# Patient Record
Sex: FEMALE | Race: BLACK OR AFRICAN AMERICAN | Employment: FULL TIME | URBAN - METROPOLITAN AREA
[De-identification: names, ages, dates, MRNs, and addresses within clinical notes are randomized per-mention and may not be internally consistent; named-entity substitution may affect disease eponyms.]

---

## 2020-01-29 ENCOUNTER — HOSPITAL ENCOUNTER (OUTPATIENT)
Dept: ULTRASOUND IMAGING | Age: 25
Discharge: HOME | End: 2020-01-29
Payer: COMMERCIAL

## 2020-01-29 ENCOUNTER — HOSPITAL ENCOUNTER (OUTPATIENT)
Age: 25
Discharge: HOME | End: 2020-01-29
Payer: COMMERCIAL

## 2020-01-29 LAB
ALBUMIN SERPL-MCNC: 4.5 G/DL (ref 3.5–5.2)
ALP BLD-CCNC: 67 U/L (ref 35–104)
ALT SERPL-CCNC: 10 U/L (ref 0–32)
AMYLASE: 117 U/L (ref 20–100)
ANION GAP SERPL CALCULATED.3IONS-SCNC: 13 MMOL/L (ref 7–16)
AST SERPL-CCNC: 19 U/L (ref 0–31)
BASOPHILS ABSOLUTE: 0.03 E9/L (ref 0–0.2)
BASOPHILS RELATIVE PERCENT: 0.5 % (ref 0–2)
BILIRUB SERPL-MCNC: 0.4 MG/DL (ref 0–1.2)
BUN BLDV-MCNC: 7 MG/DL (ref 6–20)
CALCIUM SERPL-MCNC: 9.8 MG/DL (ref 8.6–10.2)
CHLORIDE BLD-SCNC: 104 MMOL/L (ref 98–107)
CO2: 25 MMOL/L (ref 22–29)
CREAT SERPL-MCNC: 0.8 MG/DL (ref 0.5–1)
EOSINOPHILS ABSOLUTE: 0.5 E9/L (ref 0.05–0.5)
EOSINOPHILS RELATIVE PERCENT: 8.3 % (ref 0–6)
GFR AFRICAN AMERICAN: >60
GFR NON-AFRICAN AMERICAN: >60 ML/MIN/1.73
GLUCOSE FASTING: 96 MG/DL (ref 74–99)
HCT VFR BLD CALC: 39.3 % (ref 34–48)
HEMOGLOBIN: 12.6 G/DL (ref 11.5–15.5)
IMMATURE GRANULOCYTES #: 0.01 E9/L
IMMATURE GRANULOCYTES %: 0.2 % (ref 0–5)
LIPASE: 16 U/L (ref 13–60)
LYMPHOCYTES ABSOLUTE: 2.32 E9/L (ref 1.5–4)
LYMPHOCYTES RELATIVE PERCENT: 38.7 % (ref 20–42)
MCH RBC QN AUTO: 31.3 PG (ref 26–35)
MCHC RBC AUTO-ENTMCNC: 32.1 % (ref 32–34.5)
MCV RBC AUTO: 97.5 FL (ref 80–99.9)
MONOCYTES ABSOLUTE: 0.52 E9/L (ref 0.1–0.95)
MONOCYTES RELATIVE PERCENT: 8.7 % (ref 2–12)
NEUTROPHILS ABSOLUTE: 2.62 E9/L (ref 1.8–7.3)
NEUTROPHILS RELATIVE PERCENT: 43.6 % (ref 43–80)
PDW BLD-RTO: 12 FL (ref 11.5–15)
PLATELET # BLD: 280 E9/L (ref 130–450)
PMV BLD AUTO: 10.4 FL (ref 7–12)
POTASSIUM SERPL-SCNC: 3.9 MMOL/L (ref 3.5–5)
RBC # BLD: 4.03 E12/L (ref 3.5–5.5)
SEDIMENTATION RATE, ERYTHROCYTE: 6 MM/HR (ref 0–20)
SODIUM BLD-SCNC: 142 MMOL/L (ref 132–146)
TOTAL PROTEIN: 7.5 G/DL (ref 6.4–8.3)
TSH SERPL DL<=0.05 MIU/L-ACNC: 0.88 UIU/ML (ref 0.27–4.2)
WBC # BLD: 6 E9/L (ref 4.5–11.5)

## 2020-01-29 PROCEDURE — 80053 COMPREHEN METABOLIC PANEL: CPT

## 2020-01-29 PROCEDURE — 84443 ASSAY THYROID STIM HORMONE: CPT

## 2020-01-29 PROCEDURE — 83690 ASSAY OF LIPASE: CPT

## 2020-01-29 PROCEDURE — 76705 ECHO EXAM OF ABDOMEN: CPT

## 2020-01-29 PROCEDURE — 85651 RBC SED RATE NONAUTOMATED: CPT

## 2020-01-29 PROCEDURE — 36415 COLL VENOUS BLD VENIPUNCTURE: CPT

## 2020-01-29 PROCEDURE — 85025 COMPLETE CBC W/AUTO DIFF WBC: CPT

## 2020-01-29 PROCEDURE — 82150 ASSAY OF AMYLASE: CPT

## 2020-03-17 ENCOUNTER — HOSPITAL ENCOUNTER (EMERGENCY)
Age: 25
Discharge: HOME | End: 2020-03-17
Attending: EMERGENCY MEDICINE
Payer: COMMERCIAL

## 2020-03-17 VITALS
OXYGEN SATURATION: 98 % | SYSTOLIC BLOOD PRESSURE: 122 MMHG | HEIGHT: 65 IN | DIASTOLIC BLOOD PRESSURE: 75 MMHG | TEMPERATURE: 97.3 F | HEART RATE: 80 BPM | WEIGHT: 139 LBS | RESPIRATION RATE: 16 BRPM | BODY MASS INDEX: 23.16 KG/M2

## 2020-03-17 LAB — STREP GRP A PCR: NEGATIVE

## 2020-03-17 PROCEDURE — 87880 STREP A ASSAY W/OPTIC: CPT

## 2020-03-17 PROCEDURE — G0381 LEV 2 HOSP TYPE B ED VISIT: HCPCS

## 2020-03-17 NOTE — ED PROVIDER NOTES
todays results, in addition to providing specific details for the plan of care and counseling regarding the diagnosis and prognosis. Questions are answered at this time and they are agreeable with the plan.      --------------------------------- IMPRESSION AND DISPOSITION ---------------------------------    IMPRESSION  1.  Acute pharyngitis, unspecified etiology        DISPOSITION  Disposition: Discharge to home  Patient condition is stable       Jorge Rankin DO  03/17/20 1529

## 2020-07-27 ENCOUNTER — HOSPITAL ENCOUNTER (OUTPATIENT)
Age: 25
Discharge: HOME | End: 2020-07-29
Payer: COMMERCIAL

## 2020-07-27 ENCOUNTER — OFFICE VISIT (OUTPATIENT)
Dept: FAMILY MEDICINE CLINIC | Age: 25
End: 2020-07-27
Payer: COMMERCIAL

## 2020-07-27 VITALS
TEMPERATURE: 98.1 F | RESPIRATION RATE: 16 BRPM | DIASTOLIC BLOOD PRESSURE: 72 MMHG | BODY MASS INDEX: 21.49 KG/M2 | SYSTOLIC BLOOD PRESSURE: 98 MMHG | OXYGEN SATURATION: 100 % | HEART RATE: 64 BPM | WEIGHT: 129 LBS | HEIGHT: 65 IN

## 2020-07-27 LAB
ALBUMIN SERPL-MCNC: 4.1 G/DL (ref 3.5–5.2)
ALP BLD-CCNC: 56 U/L (ref 35–104)
ALT SERPL-CCNC: 9 U/L (ref 0–32)
ANION GAP SERPL CALCULATED.3IONS-SCNC: 13 MMOL/L (ref 7–16)
AST SERPL-CCNC: 20 U/L (ref 0–31)
BASOPHILS ABSOLUTE: 0.02 E9/L (ref 0–0.2)
BASOPHILS RELATIVE PERCENT: 0.4 % (ref 0–2)
BILIRUB SERPL-MCNC: 0.2 MG/DL (ref 0–1.2)
BUN BLDV-MCNC: 7 MG/DL (ref 6–20)
CALCIUM SERPL-MCNC: 9.5 MG/DL (ref 8.6–10.2)
CHLORIDE BLD-SCNC: 105 MMOL/L (ref 98–107)
CHOLESTEROL, TOTAL: 132 MG/DL (ref 0–199)
CO2: 24 MMOL/L (ref 22–29)
CREAT SERPL-MCNC: 0.8 MG/DL (ref 0.5–1)
EOSINOPHILS ABSOLUTE: 0.19 E9/L (ref 0.05–0.5)
EOSINOPHILS RELATIVE PERCENT: 3.3 % (ref 0–6)
GFR AFRICAN AMERICAN: >60
GFR NON-AFRICAN AMERICAN: >60 ML/MIN/1.73
GLUCOSE BLD-MCNC: 90 MG/DL (ref 74–99)
HBA1C MFR BLD: 5.7 % (ref 4–5.6)
HCG QUALITATIVE: NEGATIVE
HCT VFR BLD CALC: 39.6 % (ref 34–48)
HDLC SERPL-MCNC: 50 MG/DL
HEMOGLOBIN: 12.1 G/DL (ref 11.5–15.5)
IMMATURE GRANULOCYTES #: 0.02 E9/L
IMMATURE GRANULOCYTES %: 0.4 % (ref 0–5)
LDL CHOLESTEROL CALCULATED: 72 MG/DL (ref 0–99)
LYMPHOCYTES ABSOLUTE: 2.09 E9/L (ref 1.5–4)
LYMPHOCYTES RELATIVE PERCENT: 36.6 % (ref 20–42)
MCH RBC QN AUTO: 31.3 PG (ref 26–35)
MCHC RBC AUTO-ENTMCNC: 30.6 % (ref 32–34.5)
MCV RBC AUTO: 102.6 FL (ref 80–99.9)
MONOCYTES ABSOLUTE: 0.49 E9/L (ref 0.1–0.95)
MONOCYTES RELATIVE PERCENT: 8.6 % (ref 2–12)
NEUTROPHILS ABSOLUTE: 2.9 E9/L (ref 1.8–7.3)
NEUTROPHILS RELATIVE PERCENT: 50.7 % (ref 43–80)
PDW BLD-RTO: 12.4 FL (ref 11.5–15)
PLATELET # BLD: 248 E9/L (ref 130–450)
PMV BLD AUTO: 11.4 FL (ref 7–12)
POTASSIUM SERPL-SCNC: 4.7 MMOL/L (ref 3.5–5)
RBC # BLD: 3.86 E12/L (ref 3.5–5.5)
SODIUM BLD-SCNC: 142 MMOL/L (ref 132–146)
TOTAL PROTEIN: 7.3 G/DL (ref 6.4–8.3)
TRIGL SERPL-MCNC: 49 MG/DL (ref 0–149)
TSH SERPL DL<=0.05 MIU/L-ACNC: 1.15 UIU/ML (ref 0.27–4.2)
VLDLC SERPL CALC-MCNC: 10 MG/DL
WBC # BLD: 5.7 E9/L (ref 4.5–11.5)

## 2020-07-27 PROCEDURE — 80061 LIPID PANEL: CPT

## 2020-07-27 PROCEDURE — 80053 COMPREHEN METABOLIC PANEL: CPT

## 2020-07-27 PROCEDURE — 84443 ASSAY THYROID STIM HORMONE: CPT

## 2020-07-27 PROCEDURE — 36415 COLL VENOUS BLD VENIPUNCTURE: CPT

## 2020-07-27 PROCEDURE — 84703 CHORIONIC GONADOTROPIN ASSAY: CPT

## 2020-07-27 PROCEDURE — 83036 HEMOGLOBIN GLYCOSYLATED A1C: CPT

## 2020-07-27 PROCEDURE — 99204 OFFICE O/P NEW MOD 45 MIN: CPT | Performed by: FAMILY MEDICINE

## 2020-07-27 PROCEDURE — 85025 COMPLETE CBC W/AUTO DIFF WBC: CPT

## 2020-07-27 ASSESSMENT — PATIENT HEALTH QUESTIONNAIRE - PHQ9
SUM OF ALL RESPONSES TO PHQ9 QUESTIONS 1 & 2: 0
2. FEELING DOWN, DEPRESSED OR HOPELESS: 0
1. LITTLE INTEREST OR PLEASURE IN DOING THINGS: 0
SUM OF ALL RESPONSES TO PHQ QUESTIONS 1-9: 0
SUM OF ALL RESPONSES TO PHQ QUESTIONS 1-9: 0

## 2020-08-01 PROBLEM — M25.552 PAIN OF LEFT HIP JOINT: Status: ACTIVE | Noted: 2020-08-01

## 2020-08-01 PROBLEM — R63.4 WEIGHT LOSS: Status: ACTIVE | Noted: 2020-08-01

## 2020-08-01 PROBLEM — M25.511 CHRONIC RIGHT SHOULDER PAIN: Status: ACTIVE | Noted: 2020-08-01

## 2020-08-01 PROBLEM — Z00.00 PHYSICAL EXAM: Status: ACTIVE | Noted: 2020-08-01

## 2020-08-01 PROBLEM — G89.29 CHRONIC RIGHT SHOULDER PAIN: Status: ACTIVE | Noted: 2020-08-01

## 2020-08-01 PROBLEM — E78.2 MIXED HYPERLIPIDEMIA: Status: ACTIVE | Noted: 2020-08-01

## 2020-08-01 PROBLEM — R53.83 FATIGUE: Status: ACTIVE | Noted: 2020-08-01

## 2020-08-01 ASSESSMENT — ENCOUNTER SYMPTOMS
COLOR CHANGE: 0
EYE REDNESS: 0
EYE DISCHARGE: 0
NAUSEA: 0
ANAL BLEEDING: 0
DIARRHEA: 0
EYE ITCHING: 0
RESPIRATORY NEGATIVE: 1
COUGH: 0
SHORTNESS OF BREATH: 0
ABDOMINAL PAIN: 0
SINUS PRESSURE: 0
EYE PAIN: 0
RECTAL PAIN: 0
CHEST TIGHTNESS: 0
CHOKING: 0
RHINORRHEA: 0
VOICE CHANGE: 0
SORE THROAT: 0
ALLERGIC/IMMUNOLOGIC NEGATIVE: 1
BACK PAIN: 0
PHOTOPHOBIA: 0
FACIAL SWELLING: 0
BLOOD IN STOOL: 0
WHEEZING: 0
SINUS PAIN: 0
STRIDOR: 0
ABDOMINAL DISTENTION: 0
EYES NEGATIVE: 1
TROUBLE SWALLOWING: 0
CONSTIPATION: 0
VOMITING: 0

## 2020-08-02 NOTE — PROGRESS NOTES
SUBJECTIVE  Fco Ramsey is a 25 y.o. female. HPI/Chief C/O:  Chief Complaint   Patient presents with   [facility] Establish Care    Weight Loss     ongoing since February- she lost 60 lbs without changing anything     No Known Allergies  This 25year old female presents for physical exam. Pt c/o weight loss, 60 pounds since 2/2020 without trying with decreased appetite. Pt has left hip and right shoulder pain. ROS:  Review of Systems   Constitutional: Positive for appetite change, fatigue and unexpected weight change. Negative for activity change, chills, diaphoresis and fever. HENT: Negative. Negative for congestion, dental problem, drooling, ear discharge, ear pain, facial swelling, hearing loss, mouth sores, nosebleeds, postnasal drip, rhinorrhea, sinus pressure, sinus pain, sneezing, sore throat, tinnitus, trouble swallowing and voice change. Eyes: Negative. Negative for photophobia, pain, discharge, redness, itching and visual disturbance. Respiratory: Negative. Negative for cough, choking, chest tightness, shortness of breath, wheezing and stridor. Cardiovascular: Negative. Negative for chest pain, palpitations and leg swelling. Gastrointestinal: Negative for abdominal distention, abdominal pain, anal bleeding, blood in stool, constipation, diarrhea, nausea, rectal pain and vomiting. Endocrine: Negative. Negative for cold intolerance, heat intolerance, polydipsia, polyphagia and polyuria. Genitourinary: Negative. Negative for decreased urine volume, difficulty urinating, dysuria, flank pain, frequency, genital sores, hematuria, menstrual problem, pelvic pain and urgency. Musculoskeletal: Positive for arthralgias and myalgias. Negative for back pain, gait problem, joint swelling, neck pain and neck stiffness. Skin: Negative. Negative for color change, pallor, rash and wound. Allergic/Immunologic: Negative. Neurological: Negative.   Negative for dizziness, tremors, seizures, syncope, facial asymmetry, speech difficulty, weakness, light-headedness, numbness and headaches. Hematological: Negative. Negative for adenopathy. Does not bruise/bleed easily. Psychiatric/Behavioral: Positive for sleep disturbance. Negative for agitation, behavioral problems, confusion, decreased concentration, dysphoric mood, hallucinations, self-injury and suicidal ideas. The patient is nervous/anxious. The patient is not hyperactive. Past Medical/Surgical Hx;  Reviewed with patient      Diagnosis Date    Mixed hyperlipidemia 8/1/2020     Past Surgical History:   Procedure Laterality Date    UMBILICAL HERNIA REPAIR  10/1995       Past Family Hx:  Reviewed with patient  History reviewed. No pertinent family history. Social Hx:  Reviewed with patient  Social History     Tobacco Use    Smoking status: Current Every Day Smoker     Packs/day: 0.50     Types: Cigarettes    Smokeless tobacco: Never Used   Substance Use Topics    Alcohol use: No     Alcohol/week: 0.0 standard drinks       OBJECTIVE  BP 98/72   Pulse 64   Temp 98.1 °F (36.7 °C) (Tympanic)   Resp 16   Ht 5' 5\" (1.651 m)   Wt 129 lb (58.5 kg)   LMP 07/25/2020   SpO2 100%   BMI 21.47 kg/m²     Problem List:  Angie Polanco does not have any pertinent problems on file. PHYS EX:  Physical Exam  Vitals signs and nursing note reviewed. Constitutional:       General: She is not in acute distress. Appearance: Normal appearance. She is well-developed. She is not ill-appearing, toxic-appearing or diaphoretic. HENT:      Head: Normocephalic and atraumatic. Right Ear: Tympanic membrane, ear canal and external ear normal.      Left Ear: Tympanic membrane, ear canal and external ear normal.      Nose: Nose normal. No congestion or rhinorrhea. Mouth/Throat:      Mouth: Mucous membranes are moist.      Pharynx: Oropharynx is clear. No oropharyngeal exudate or posterior oropharyngeal erythema.    Eyes:      General: No scleral icterus. Right eye: No discharge. Left eye: No discharge. Conjunctiva/sclera: Conjunctivae normal.      Pupils: Pupils are equal, round, and reactive to light. Neck:      Musculoskeletal: Normal range of motion and neck supple. No neck rigidity or muscular tenderness. Thyroid: No thyromegaly. Vascular: No carotid bruit or JVD. Trachea: No tracheal deviation. Cardiovascular:      Rate and Rhythm: Normal rate and regular rhythm. Pulses: Normal pulses. Heart sounds: Normal heart sounds. No murmur. No friction rub. No gallop. Pulmonary:      Effort: Pulmonary effort is normal. No respiratory distress. Breath sounds: Normal breath sounds. No stridor. No wheezing, rhonchi or rales. Chest:      Chest wall: No tenderness. Abdominal:      General: Bowel sounds are normal. There is no distension. Palpations: Abdomen is soft. There is no mass. Tenderness: There is no abdominal tenderness. There is no guarding or rebound. Hernia: No hernia is present. Musculoskeletal:         General: Tenderness present. No swelling, deformity or signs of injury. Right lower leg: No edema. Left lower leg: No edema. Comments: Pain and decreased ROM left hip and right shoulder. Lymphadenopathy:      Cervical: No cervical adenopathy. Skin:     General: Skin is warm. Coloration: Skin is not jaundiced or pale. Findings: No bruising, erythema, lesion or rash. Neurological:      General: No focal deficit present. Mental Status: She is alert and oriented to person, place, and time. Cranial Nerves: No cranial nerve deficit. Sensory: No sensory deficit. Motor: No weakness or abnormal muscle tone. Coordination: Coordination normal.      Gait: Gait normal.      Deep Tendon Reflexes: Reflexes are normal and symmetric.  Reflexes normal.   Psychiatric:         Mood and Affect: Mood normal.         Behavior: Behavior normal. Thought Content: Thought content normal.         Judgment: Judgment normal.         ASSESSMENT/PLAN  Betsy was seen today for establish care and weight loss. Diagnoses and all orders for this visit:    Physical exam  -     COMPREHENSIVE METABOLIC PANEL; Future  -     CBC Auto Differential; Future  Not controlled. Lab. Weight loss  -     COMPREHENSIVE METABOLIC PANEL; Future  -     CBC Auto Differential; Future  Not controlled. Lab. Mixed hyperlipidemia  -     COMPREHENSIVE METABOLIC PANEL; Future  -     LIPID PANEL; Future  -     CBC Auto Differential; Future  Stable. Lab, low chol. Diet. IFG (impaired fasting glucose)  -     COMPREHENSIVE METABOLIC PANEL; Future  -     Hemoglobin A1C; Future  -     CBC Auto Differential; Future  Patient instructed on labs. Fatigue, unspecified type  -     COMPREHENSIVE METABOLIC PANEL; Future  -     TSH without Reflex; Future  -     CBC Auto Differential; Future  -     HCG, SERUM, QUALITATIVE; Future  Not controlled lab. Pain of left hip joint  -     COMPREHENSIVE METABOLIC PANEL; Future  -     CBC Auto Differential; Future  -     XR HIP LEFT (2-3 VIEWS); Future  Not controlled. Chronic right shoulder pain  -     COMPREHENSIVE METABOLIC PANEL; Future  -     CBC Auto Differential; Future  -     XR SHOULDER RIGHT (MIN 2 VIEWS); Future  Not controlled. Pt instructed if any worse go ED ASAP. No outpatient encounter medications on file as of 7/27/2020. No facility-administered encounter medications on file as of 7/27/2020. Return in about 4 weeks (around 8/24/2020).         Reviewed recent labs related to Betsy's current problems      Discussed importance of regular Health Maintenance follow up  Health Maintenance   Topic    Varicella vaccine (1 of 2 - 2-dose childhood series)    Pneumococcal 0-64 years Vaccine (1 of 1 - PPSV23)    HPV vaccine (1 - 2-dose series)    HIV screen     Chlamydia screen     DTaP/Tdap/Td vaccine (1 - Tdap)    Cervical cancer screen     Flu vaccine (1)    A1C test (Diabetic or Prediabetic)     Hepatitis A vaccine     Hepatitis B vaccine     Hib vaccine     Meningococcal (ACWY) vaccine

## 2020-08-31 PROBLEM — Z00.00 PHYSICAL EXAM: Status: RESOLVED | Noted: 2020-08-01 | Resolved: 2020-08-31

## 2023-03-20 LAB
ERYTHROCYTE DISTRIBUTION WIDTH (RATIO) BY AUTOMATED COUNT: 11.6 % (ref 11.5–14.5)
ERYTHROCYTE MEAN CORPUSCULAR HEMOGLOBIN CONCENTRATION (G/DL) BY AUTOMATED: 32.7 G/DL (ref 32–36)
ERYTHROCYTE MEAN CORPUSCULAR VOLUME (FL) BY AUTOMATED COUNT: 96 FL (ref 80–100)
ERYTHROCYTES (10*6/UL) IN BLOOD BY AUTOMATED COUNT: 4.07 X10E12/L (ref 4–5.2)
HEMATOCRIT (%) IN BLOOD BY AUTOMATED COUNT: 39.2 % (ref 36–46)
HEMOGLOBIN (G/DL) IN BLOOD: 12.8 G/DL (ref 12–16)
LEUKOCYTES (10*3/UL) IN BLOOD BY AUTOMATED COUNT: 9 X10E9/L (ref 4.4–11.3)
PLATELETS (10*3/UL) IN BLOOD AUTOMATED COUNT: 289 X10E9/L (ref 150–450)
REFLEX ADDED, ANEMIA PANEL: NORMAL

## 2023-03-21 LAB
ABO GROUP (TYPE) IN BLOOD: NORMAL
ANTIBODY SCREEN: NORMAL
HEPATITIS B VIRUS SURFACE AG PRESENCE IN SERUM: NONREACTIVE
HIV 1/ 2 AG/AB SCREEN: NONREACTIVE
RH FACTOR: NORMAL
RUBELLA VIRUS IGG AB: POSITIVE
SYPHILIS TOTAL AB: NONREACTIVE

## 2023-03-22 LAB — URINE CULTURE: NORMAL

## 2023-03-23 LAB
CHLAMYDIA TRACH., AMPLIFIED: NEGATIVE
HEMOGLOBIN A2: 3 %
HEMOGLOBIN A: 96.7 %
HEMOGLOBIN F: 0.3 %
HEMOGLOBIN IDENTIFICATION INTERPRETATION: NORMAL
N. GONORRHEA, AMPLIFIED: NEGATIVE
PATH REVIEW-HGB IDENTIFICATION: NORMAL

## 2023-05-04 LAB — SMA RESULT: NORMAL

## 2023-05-05 LAB — CF RESULTS: NORMAL

## 2023-08-28 LAB
ERYTHROCYTE DISTRIBUTION WIDTH (RATIO) BY AUTOMATED COUNT: 12.7 % (ref 11.5–14.5)
ERYTHROCYTE MEAN CORPUSCULAR HEMOGLOBIN CONCENTRATION (G/DL) BY AUTOMATED: 32.6 G/DL (ref 32–36)
ERYTHROCYTE MEAN CORPUSCULAR VOLUME (FL) BY AUTOMATED COUNT: 96 FL (ref 80–100)
ERYTHROCYTES (10*6/UL) IN BLOOD BY AUTOMATED COUNT: 3.16 X10E12/L (ref 4–5.2)
GLUCOSE, 1 HR SCREEN, PREG: 161 MG/DL
HEMATOCRIT (%) IN BLOOD BY AUTOMATED COUNT: 30.4 % (ref 36–46)
HEMOGLOBIN (G/DL) IN BLOOD: 9.9 G/DL (ref 12–16)
LEUKOCYTES (10*3/UL) IN BLOOD BY AUTOMATED COUNT: 10.2 X10E9/L (ref 4.4–11.3)
PLATELETS (10*3/UL) IN BLOOD AUTOMATED COUNT: 207 X10E9/L (ref 150–450)
REFLEX ADDED, ANEMIA PANEL: ABNORMAL

## 2023-08-29 LAB
FERRITIN, PREGNANCY: 17 UG/L
FOLATE, SERUM, PREGNANCY: >24 NG/ML
IRON (UG/DL) IN SER/PLAS IN PREGNANCY: 58 UG/DL
IRON BINDING CAPACITY (UG/DL) IN PREGNANCY: 453 UG/DL
IRON SATURATION (%) IN PREGNANCY: 13 %
SYPHILIS TOTAL AB: NONREACTIVE
VITAMIN B12, PREGNANCY: 400 PG/ML

## 2023-09-01 LAB
GLUCOSE THREE HOUR: 114 MG/DL
GLUCOSE TWO HOUR: 129 MG/DL
GLUCOSE, FASTING: 75 MG/DL
GLUCOSE, ONE HOUR: 147 MG/DL
GTTCM: NORMAL

## 2023-09-26 LAB
ERYTHROCYTE DISTRIBUTION WIDTH (RATIO) BY AUTOMATED COUNT: 15.9 % (ref 11.5–14.5)
ERYTHROCYTE MEAN CORPUSCULAR HEMOGLOBIN CONCENTRATION (G/DL) BY AUTOMATED: 31.2 G/DL (ref 32–36)
ERYTHROCYTE MEAN CORPUSCULAR VOLUME (FL) BY AUTOMATED COUNT: 102 FL (ref 80–100)
ERYTHROCYTES (10*6/UL) IN BLOOD BY AUTOMATED COUNT: 3.57 X10E12/L (ref 4–5.2)
HEMATOCRIT (%) IN BLOOD BY AUTOMATED COUNT: 36.5 % (ref 36–46)
HEMOGLOBIN (G/DL) IN BLOOD: 11.4 G/DL (ref 12–16)
LEUKOCYTES (10*3/UL) IN BLOOD BY AUTOMATED COUNT: 8.9 X10E9/L (ref 4.4–11.3)
PLATELETS (10*3/UL) IN BLOOD AUTOMATED COUNT: 184 X10E9/L (ref 150–450)
REFLEX ADDED, ANEMIA PANEL: ABNORMAL

## 2023-09-29 PROBLEM — O99.019 ANEMIA OF PREGNANCY (HHS-HCC): Status: ACTIVE | Noted: 2023-09-29

## 2023-09-29 PROBLEM — O36.60X0 LGA (LARGE FOR GESTATIONAL AGE) FETUS AFFECTING MANAGEMENT OF MOTHER (HHS-HCC): Status: ACTIVE | Noted: 2023-07-31

## 2023-09-29 PROBLEM — R73.09 ELEVATED GLUCOSE LEVEL: Status: ACTIVE | Noted: 2023-09-29

## 2023-09-29 PROBLEM — Z34.90 PREGNANCY (HHS-HCC): Status: ACTIVE | Noted: 2023-09-29

## 2023-09-29 RX ORDER — POLYETHYLENE GLYCOL 3350 17 G/17G
17 POWDER, FOR SOLUTION ORAL DAILY
COMMUNITY
End: 2023-11-12 | Stop reason: HOSPADM

## 2023-09-29 RX ORDER — FERROUS SULFATE 325(65) MG
65 TABLET ORAL
COMMUNITY
End: 2023-11-12 | Stop reason: HOSPADM

## 2023-09-29 RX ORDER — ASPIRIN 81 MG/1
81 TABLET ORAL DAILY
COMMUNITY
End: 2023-11-12 | Stop reason: HOSPADM

## 2023-09-29 RX ORDER — DOCUSATE SODIUM 100 MG/1
100 CAPSULE, LIQUID FILLED ORAL 2 TIMES DAILY
COMMUNITY

## 2023-10-10 ENCOUNTER — ROUTINE PRENATAL (OUTPATIENT)
Dept: OBSTETRICS AND GYNECOLOGY | Facility: CLINIC | Age: 28
End: 2023-10-10
Payer: COMMERCIAL

## 2023-10-10 VITALS — SYSTOLIC BLOOD PRESSURE: 110 MMHG | WEIGHT: 185 LBS | BODY MASS INDEX: 31.02 KG/M2 | DIASTOLIC BLOOD PRESSURE: 70 MMHG

## 2023-10-10 DIAGNOSIS — O99.013 ANEMIA DURING PREGNANCY IN THIRD TRIMESTER (HHS-HCC): Primary | ICD-10-CM

## 2023-10-10 DIAGNOSIS — Z3A.36 36 WEEKS GESTATION OF PREGNANCY (HHS-HCC): ICD-10-CM

## 2023-10-10 PROBLEM — O36.60X0 LGA (LARGE FOR GESTATIONAL AGE) FETUS AFFECTING MANAGEMENT OF MOTHER (HHS-HCC): Status: RESOLVED | Noted: 2023-07-31 | Resolved: 2023-10-10

## 2023-10-10 PROBLEM — R73.09 ELEVATED GLUCOSE LEVEL: Status: RESOLVED | Noted: 2023-09-29 | Resolved: 2023-10-10

## 2023-10-10 PROBLEM — Z34.03 FIRST PREGNANCY, THIRD TRIMESTER (HHS-HCC): Status: ACTIVE | Noted: 2023-09-29

## 2023-10-10 PROCEDURE — 87081 CULTURE SCREEN ONLY: CPT

## 2023-10-10 PROCEDURE — 99213 OFFICE O/P EST LOW 20 MIN: CPT | Performed by: OBSTETRICS & GYNECOLOGY

## 2023-10-10 RX ORDER — DOXYLAMINE SUCCINATE 25 MG
25 TABLET ORAL NIGHTLY
COMMUNITY
Start: 2023-03-29 | End: 2023-11-12 | Stop reason: HOSPADM

## 2023-10-10 RX ORDER — PYRIDOXINE HCL (VITAMIN B6) 100 MG
100 TABLET ORAL DAILY
COMMUNITY
Start: 2023-03-29

## 2023-10-10 NOTE — PROGRESS NOTES
"Subjective   Patient ID [id number]   Lakeisha Edgar is a 27 y.o.  at 36w3d with a working estimated date of delivery of 2023, by Last Menstrual Period who presents for a routine prenatal visit. She denies vaginal bleeding, leakage of fluid, decreased fetal movements, or contractions.    Patient with good fetal movement, no concerns.    Objective   Physical Exam:   Weight: 83.9 kg (185 lb)  Expected Total Weight Gain: 11.5 kg (25 lb)-16 kg (35 lb)   Pregravid BMI: 21.46  BP: 110/70  Fetal Heart Rate: 150 Fundal Height (cm): 37 cm Presentation: Vertex           Prenatal Labs  Urine Dip:  No results found for: \"KETONESU\", \"GLUCOSEUR\", \"LEUKOCYTESUR\"  Lab Results   Component Value Date    HGB 11.4 (L) 2023    HCT 36.5 2023    HEPBSAG NONREACTIVE 2023            Assessment/Plan   Problem List Items Addressed This Visit             ICD-10-CM    Anemia of pregnancy - Primary O99.019     Patient on iron   Hgb on  was 11.4           36 weeks gestation of pregnancy Z3A.36     Patient doing well with no problems  GBS performed today  Follow-up in 1 week          "

## 2023-10-13 LAB — GP B STREP GENITAL QL CULT: NORMAL

## 2023-10-16 PROBLEM — Z3A.37 37 WEEKS GESTATION OF PREGNANCY (HHS-HCC): Status: ACTIVE | Noted: 2023-10-10

## 2023-10-17 ENCOUNTER — APPOINTMENT (OUTPATIENT)
Dept: OBSTETRICS AND GYNECOLOGY | Facility: CLINIC | Age: 28
End: 2023-10-17
Payer: COMMERCIAL

## 2023-10-20 ENCOUNTER — APPOINTMENT (OUTPATIENT)
Dept: OBSTETRICS AND GYNECOLOGY | Facility: CLINIC | Age: 28
End: 2023-10-20
Payer: COMMERCIAL

## 2023-10-24 ENCOUNTER — ROUTINE PRENATAL (OUTPATIENT)
Dept: OBSTETRICS AND GYNECOLOGY | Facility: CLINIC | Age: 28
End: 2023-10-24
Payer: COMMERCIAL

## 2023-10-24 VITALS — SYSTOLIC BLOOD PRESSURE: 122 MMHG | BODY MASS INDEX: 31.69 KG/M2 | DIASTOLIC BLOOD PRESSURE: 74 MMHG | WEIGHT: 189 LBS

## 2023-10-24 DIAGNOSIS — Z3A.38 38 WEEKS GESTATION OF PREGNANCY (HHS-HCC): ICD-10-CM

## 2023-10-24 DIAGNOSIS — Z34.03 FIRST PREGNANCY, THIRD TRIMESTER (HHS-HCC): Primary | ICD-10-CM

## 2023-10-24 PROCEDURE — 99213 OFFICE O/P EST LOW 20 MIN: CPT | Performed by: OBSTETRICS & GYNECOLOGY

## 2023-10-24 NOTE — ASSESSMENT & PLAN NOTE
Patient doing well, good fetal movement, no concerns  Cervix: Closed/50%  Patient on ASA prophylaxis  Anemia: On iron, normal hemoglobin electrophoresis, Hgb on 9/27 was 11.4  Elevated 1 hour glucose, normal 3-hour OGTT  Last ultrasound on 8/29: 31.5 weeks, AGA at 71st percentile  GBS negative     PLAN:  Follow-up in 1 week

## 2023-10-24 NOTE — PROGRESS NOTES
"Subjective   Patient ID [id number]   Lakeisha Edgar is a 28 y.o.  at 38w3d with a working estimated date of delivery of 2023, by Last Menstrual Period who presents for a routine prenatal visit. She denies vaginal bleeding, leakage of fluid, decreased fetal movements, or contractions.    Patient doing well with no concerns, good fetal movement, occasional contractions.  Patient continues with aspirin and vitamins    Objective   Physical Exam:   Weight: 85.7 kg (189 lb)  Expected Total Weight Gain: 11.5 kg (25 lb)-16 kg (35 lb)   Pregravid BMI: 21.46  BP: 122/74  Fetal Heart Rate: 160 Fundal Height (cm): 37 cm Presentation: Vertex  Dilation: Closed Effacement (%): 50      Prenatal Labs  Urine Dip:  No results found for: \"KETONESU\", \"GLUCOSEUR\", \"LEUKOCYTESUR\"  Lab Results   Component Value Date    HGB 11.4 (L) 2023    HCT 36.5 2023    ABO O 2023    HEPBSAG NONREACTIVE 2023     No results found for: \"PAPPA\", \"AFP\", \"HCG\", \"ESTRIOL\", \"INHBA\"  No results found for: \"GLUF\", \"GLUT1\", \"EDDNVYG8GL\", \"NKLQDTL6RX\"    Imaging  The most recent ultrasound was performed on   with a study GA of   and EFW of  .          Assessment/Plan   Problem List Items Addressed This Visit             ICD-10-CM    First pregnancy, third trimester - Primary Z34.03    38 weeks gestation of pregnancy Z3A.38     Patient doing well, good fetal movement, no concerns  Cervix: Closed/50%  Patient on ASA prophylaxis  Anemia: On iron, normal hemoglobin electrophoresis, Hgb on  was 11.4  Elevated 1 hour glucose, normal 3-hour OGTT  Last ultrasound on : 31.5 weeks, AGA at 71st percentile  GBS negative     PLAN:  Follow-up in 1 week            "

## 2023-10-29 PROBLEM — Z3A.39 39 WEEKS GESTATION OF PREGNANCY (HHS-HCC): Status: ACTIVE | Noted: 2023-10-10

## 2023-10-31 ENCOUNTER — ROUTINE PRENATAL (OUTPATIENT)
Dept: OBSTETRICS AND GYNECOLOGY | Facility: CLINIC | Age: 28
End: 2023-10-31
Payer: COMMERCIAL

## 2023-10-31 ENCOUNTER — APPOINTMENT (OUTPATIENT)
Dept: PEDIATRICS | Facility: CLINIC | Age: 28
End: 2023-10-31
Payer: COMMERCIAL

## 2023-10-31 VITALS — DIASTOLIC BLOOD PRESSURE: 70 MMHG | BODY MASS INDEX: 32.7 KG/M2 | WEIGHT: 195 LBS | SYSTOLIC BLOOD PRESSURE: 118 MMHG

## 2023-10-31 DIAGNOSIS — Z3A.39 39 WEEKS GESTATION OF PREGNANCY (HHS-HCC): Primary | ICD-10-CM

## 2023-10-31 DIAGNOSIS — Z34.03 FIRST PREGNANCY, THIRD TRIMESTER (HHS-HCC): ICD-10-CM

## 2023-10-31 PROCEDURE — 99213 OFFICE O/P EST LOW 20 MIN: CPT | Performed by: OBSTETRICS & GYNECOLOGY

## 2023-10-31 NOTE — ASSESSMENT & PLAN NOTE
Good prenatal course.  Patient declines pelvic exam today  We did discuss potential induction, patient desires to hold off at this time, will consider at next visit.

## 2023-10-31 NOTE — PROGRESS NOTES
"Subjective     Lakeisha Edgar is a 28 y.o.  at 39w3d with a working estimated date of delivery of 2023, by Last Menstrual Period who presents for a routine prenatal visit.     Patient with good fetal movement, patient is feeling increased pelvic pressure.  Patient declines pelvic exam today.  Patient was inquiring about induction, desires to hold off at this time.    Objective   Physical Exam  Weight: 88.5 kg (195 lb)  Expected Total Weight Gain: 11.5 kg (25 lb)-16 kg (35 lb)   Pregravid BMI: 21.46  BP: 118/70  Fetal Heart Rate: 150 Fundal Height (cm): 37 cm    Prenatal Labs  Urine dip:  No results found for: \"KETONESU\", \"GLUCOSEUR\", \"LEUKOCYTESUR\"    Lab Results   Component Value Date    HGB 11.4 (L) 2023    HCT 36.5 2023    ABO O 2023    HEPBSAG NONREACTIVE 2023             Problem List Items Addressed This Visit       First pregnancy, third trimester    39 weeks gestation of pregnancy - Primary    Overview     Patient on ASA prophylaxis  Elevated 1 hour glucose, normal 3-hour OGTT  Last ultrasound on : 31.5 weeks, AGA at 71st percentile  GBS negative 10/10         Current Assessment & Plan     Good prenatal course.  Patient declines pelvic exam today  We did discuss potential induction, patient desires to hold off at this time, will consider at next visit.             "

## 2023-11-07 ENCOUNTER — ROUTINE PRENATAL (OUTPATIENT)
Dept: OBSTETRICS AND GYNECOLOGY | Facility: CLINIC | Age: 28
End: 2023-11-07
Payer: COMMERCIAL

## 2023-11-07 VITALS — WEIGHT: 198 LBS | BODY MASS INDEX: 33.2 KG/M2 | SYSTOLIC BLOOD PRESSURE: 126 MMHG | DIASTOLIC BLOOD PRESSURE: 80 MMHG

## 2023-11-07 DIAGNOSIS — Z3A.40 40 WEEKS GESTATION OF PREGNANCY (HHS-HCC): ICD-10-CM

## 2023-11-07 DIAGNOSIS — O99.013 ANEMIA DURING PREGNANCY IN THIRD TRIMESTER (HHS-HCC): ICD-10-CM

## 2023-11-07 DIAGNOSIS — Z34.03 FIRST PREGNANCY, THIRD TRIMESTER (HHS-HCC): Primary | ICD-10-CM

## 2023-11-07 PROCEDURE — 99213 OFFICE O/P EST LOW 20 MIN: CPT | Performed by: OBSTETRICS & GYNECOLOGY

## 2023-11-07 NOTE — ASSESSMENT & PLAN NOTE
IUP at 40.3 weeks  Patient is doing well with good fetal movement.  Cervix closed/70%  Patient desires to proceed with induction.  Discussed options of Pitocin, Cytotec, CRB, and AROM.    PLAN:  We will schedule induction, probably start with Cytotec

## 2023-11-07 NOTE — PROGRESS NOTES
"Subjective     Lakeihsa Edgar is a 28 y.o.  at 40w3d with a working estimated date of delivery of 2023, by Last Menstrual Period who presents for a routine prenatal visit.     Patient is uncomfortable, otherwise doing well.  Good fetal movement.  Patient does desire to proceed with an induction.    Objective   Physical Exam  Weight: 89.8 kg (198 lb)  Expected Total Weight Gain: 11.5 kg (25 lb)-16 kg (35 lb)   Pregravid BMI: 21.46  BP: 126/80  Fetal Heart Rate: 130 Fundal Height (cm): 35 cm    Prenatal Labs  Urine dip:  No results found for: \"KETONESU\", \"GLUCOSEUR\", \"LEUKOCYTESUR\"    Lab Results   Component Value Date    HGB 11.4 (L) 2023    HCT 36.5 2023    ABO O 2023    HEPBSAG NONREACTIVE 2023             Problem List Items Addressed This Visit       Anemia of pregnancy    Overview     Normal Hgb electrophoresis  On  Hgb of 9.9, started on iron  Hgb on  was 11.4  Has seen hematology         First pregnancy, third trimester - Primary    Relevant Orders    Labor Induction    39 weeks gestation of pregnancy    Overview     Patient on ASA prophylaxis  Elevated 1 hour glucose, normal 3-hour OGTT  Last ultrasound on : 31.5 weeks, AGA at 71st percentile  GBS negative 10/10         Current Assessment & Plan     IUP at 40.3 weeks  Patient is doing well with good fetal movement.  Cervix closed/70%  Patient desires to proceed with induction.  Discussed options of Pitocin, Cytotec, CRB, and AROM.    PLAN:  We will schedule induction, probably start with Cytotec             "

## 2023-11-08 ENCOUNTER — HOSPITAL ENCOUNTER (INPATIENT)
Facility: HOSPITAL | Age: 28
LOS: 4 days | Discharge: HOME | End: 2023-11-12
Attending: OBSTETRICS & GYNECOLOGY | Admitting: OBSTETRICS & GYNECOLOGY
Payer: COMMERCIAL

## 2023-11-08 PROBLEM — Z3A.40 40 WEEKS GESTATION OF PREGNANCY (HHS-HCC): Status: ACTIVE | Noted: 2023-10-10

## 2023-11-08 LAB
ABO GROUP (TYPE) IN BLOOD: NORMAL
ANTIBODY SCREEN: NORMAL
ERYTHROCYTE [DISTWIDTH] IN BLOOD BY AUTOMATED COUNT: 13.4 % (ref 11.5–14.5)
HCT VFR BLD AUTO: 35.2 % (ref 36–46)
HGB BLD-MCNC: 12 G/DL (ref 12–16)
MCH RBC QN AUTO: 33.1 PG (ref 26–34)
MCHC RBC AUTO-ENTMCNC: 34.1 G/DL (ref 32–36)
MCV RBC AUTO: 97 FL (ref 80–100)
NRBC BLD-RTO: 0 /100 WBCS (ref 0–0)
PLATELET # BLD AUTO: 182 X10*3/UL (ref 150–450)
RBC # BLD AUTO: 3.63 X10*6/UL (ref 4–5.2)
RH FACTOR (ANTIGEN D): NORMAL
WBC # BLD AUTO: 9.4 X10*3/UL (ref 4.4–11.3)

## 2023-11-08 PROCEDURE — 86850 RBC ANTIBODY SCREEN: CPT | Performed by: OBSTETRICS & GYNECOLOGY

## 2023-11-08 PROCEDURE — 86780 TREPONEMA PALLIDUM: CPT | Mod: AHULAB | Performed by: OBSTETRICS & GYNECOLOGY

## 2023-11-08 PROCEDURE — 2500000004 HC RX 250 GENERAL PHARMACY W/ HCPCS (ALT 636 FOR OP/ED): Performed by: OBSTETRICS & GYNECOLOGY

## 2023-11-08 PROCEDURE — 36415 COLL VENOUS BLD VENIPUNCTURE: CPT | Performed by: OBSTETRICS & GYNECOLOGY

## 2023-11-08 PROCEDURE — 85027 COMPLETE CBC AUTOMATED: CPT | Performed by: OBSTETRICS & GYNECOLOGY

## 2023-11-08 PROCEDURE — 1120000001 HC OB PRIVATE ROOM DAILY

## 2023-11-08 RX ORDER — LIDOCAINE HYDROCHLORIDE 10 MG/ML
30 INJECTION INFILTRATION; PERINEURAL ONCE AS NEEDED
Status: DISCONTINUED | OUTPATIENT
Start: 2023-11-08 | End: 2023-11-10

## 2023-11-08 RX ORDER — ONDANSETRON HYDROCHLORIDE 2 MG/ML
4 INJECTION, SOLUTION INTRAVENOUS EVERY 6 HOURS PRN
Status: DISCONTINUED | OUTPATIENT
Start: 2023-11-08 | End: 2023-11-10

## 2023-11-08 RX ORDER — METOCLOPRAMIDE HYDROCHLORIDE 5 MG/ML
10 INJECTION INTRAMUSCULAR; INTRAVENOUS EVERY 6 HOURS PRN
Status: DISCONTINUED | OUTPATIENT
Start: 2023-11-08 | End: 2023-11-10

## 2023-11-08 RX ORDER — LOPERAMIDE HYDROCHLORIDE 2 MG/1
4 CAPSULE ORAL EVERY 2 HOUR PRN
Status: DISCONTINUED | OUTPATIENT
Start: 2023-11-08 | End: 2023-11-10

## 2023-11-08 RX ORDER — METHYLERGONOVINE MALEATE 0.2 MG/ML
0.2 INJECTION INTRAVENOUS ONCE AS NEEDED
Status: DISCONTINUED | OUTPATIENT
Start: 2023-11-08 | End: 2023-11-10

## 2023-11-08 RX ORDER — OXYTOCIN 10 [USP'U]/ML
10 INJECTION, SOLUTION INTRAMUSCULAR; INTRAVENOUS ONCE AS NEEDED
Status: DISCONTINUED | OUTPATIENT
Start: 2023-11-08 | End: 2023-11-10

## 2023-11-08 RX ORDER — ONDANSETRON 4 MG/1
4 TABLET, FILM COATED ORAL EVERY 6 HOURS PRN
Status: DISCONTINUED | OUTPATIENT
Start: 2023-11-08 | End: 2023-11-10

## 2023-11-08 RX ORDER — CARBOPROST TROMETHAMINE 250 UG/ML
250 INJECTION, SOLUTION INTRAMUSCULAR ONCE AS NEEDED
Status: DISCONTINUED | OUTPATIENT
Start: 2023-11-08 | End: 2023-11-10

## 2023-11-08 RX ORDER — SODIUM CHLORIDE, SODIUM LACTATE, POTASSIUM CHLORIDE, CALCIUM CHLORIDE 600; 310; 30; 20 MG/100ML; MG/100ML; MG/100ML; MG/100ML
125 INJECTION, SOLUTION INTRAVENOUS CONTINUOUS
Status: DISCONTINUED | OUTPATIENT
Start: 2023-11-08 | End: 2023-11-10

## 2023-11-08 RX ORDER — LABETALOL HYDROCHLORIDE 5 MG/ML
20 INJECTION, SOLUTION INTRAVENOUS ONCE AS NEEDED
Status: DISCONTINUED | OUTPATIENT
Start: 2023-11-08 | End: 2023-11-10

## 2023-11-08 RX ORDER — TERBUTALINE SULFATE 1 MG/ML
0.25 INJECTION SUBCUTANEOUS ONCE AS NEEDED
Status: DISCONTINUED | OUTPATIENT
Start: 2023-11-08 | End: 2023-11-10

## 2023-11-08 RX ORDER — NIFEDIPINE 10 MG/1
10 CAPSULE ORAL ONCE AS NEEDED
Status: DISCONTINUED | OUTPATIENT
Start: 2023-11-08 | End: 2023-11-10

## 2023-11-08 RX ORDER — HYDRALAZINE HYDROCHLORIDE 20 MG/ML
5 INJECTION INTRAMUSCULAR; INTRAVENOUS ONCE AS NEEDED
Status: DISCONTINUED | OUTPATIENT
Start: 2023-11-08 | End: 2023-11-10

## 2023-11-08 RX ORDER — METOCLOPRAMIDE 10 MG/1
10 TABLET ORAL EVERY 6 HOURS PRN
Status: DISCONTINUED | OUTPATIENT
Start: 2023-11-08 | End: 2023-11-10

## 2023-11-08 RX ORDER — TRANEXAMIC ACID 100 MG/ML
1000 INJECTION, SOLUTION INTRAVENOUS ONCE AS NEEDED
Status: DISCONTINUED | OUTPATIENT
Start: 2023-11-08 | End: 2023-11-10

## 2023-11-08 RX ORDER — OXYTOCIN/0.9 % SODIUM CHLORIDE 30/500 ML
60 PLASTIC BAG, INJECTION (ML) INTRAVENOUS
Status: DISCONTINUED | OUTPATIENT
Start: 2023-11-08 | End: 2023-11-12 | Stop reason: HOSPADM

## 2023-11-08 RX ORDER — MISOPROSTOL 200 UG/1
800 TABLET ORAL ONCE AS NEEDED
Status: DISCONTINUED | OUTPATIENT
Start: 2023-11-08 | End: 2023-11-10

## 2023-11-08 RX ADMIN — MISOPROSTOL 25 MCG: 100 TABLET ORAL at 21:10

## 2023-11-08 SDOH — HEALTH STABILITY: MENTAL HEALTH: WERE YOU ABLE TO COMPLETE ALL THE BEHAVIORAL HEALTH SCREENINGS?: YES

## 2023-11-08 SDOH — SOCIAL STABILITY: SOCIAL INSECURITY: ABUSE SCREEN: ADULT

## 2023-11-08 SDOH — SOCIAL STABILITY: SOCIAL INSECURITY: HAVE YOU HAD THOUGHTS OF HARMING ANYONE ELSE?: NO

## 2023-11-08 SDOH — SOCIAL STABILITY: SOCIAL INSECURITY: ARE THERE ANY APPARENT SIGNS OF INJURIES/BEHAVIORS THAT COULD BE RELATED TO ABUSE/NEGLECT?: NO

## 2023-11-08 SDOH — SOCIAL STABILITY: SOCIAL INSECURITY: DOES ANYONE TRY TO KEEP YOU FROM HAVING/CONTACTING OTHER FRIENDS OR DOING THINGS OUTSIDE YOUR HOME?: NO

## 2023-11-08 SDOH — ECONOMIC STABILITY: HOUSING INSECURITY: DO YOU FEEL UNSAFE GOING BACK TO THE PLACE WHERE YOU ARE LIVING?: NO

## 2023-11-08 SDOH — HEALTH STABILITY: MENTAL HEALTH: SUICIDAL BEHAVIOR (LIFETIME): NO

## 2023-11-08 SDOH — SOCIAL STABILITY: SOCIAL INSECURITY: VERBAL ABUSE: DENIES

## 2023-11-08 SDOH — HEALTH STABILITY: MENTAL HEALTH: WISH TO BE DEAD (PAST 1 MONTH): NO

## 2023-11-08 SDOH — SOCIAL STABILITY: SOCIAL INSECURITY: HAS ANYONE EVER THREATENED TO HURT YOUR FAMILY OR YOUR PETS?: NO

## 2023-11-08 SDOH — SOCIAL STABILITY: SOCIAL INSECURITY: DO YOU FEEL ANYONE HAS EXPLOITED OR TAKEN ADVANTAGE OF YOU FINANCIALLY OR OF YOUR PERSONAL PROPERTY?: NO

## 2023-11-08 SDOH — HEALTH STABILITY: MENTAL HEALTH: NON-SPECIFIC ACTIVE SUICIDAL THOUGHTS (PAST 1 MONTH): NO

## 2023-11-08 SDOH — SOCIAL STABILITY: SOCIAL INSECURITY: ARE YOU OR HAVE YOU BEEN THREATENED OR ABUSED PHYSICALLY, EMOTIONALLY, OR SEXUALLY BY ANYONE?: NO

## 2023-11-08 SDOH — SOCIAL STABILITY: SOCIAL INSECURITY: PHYSICAL ABUSE: DENIES

## 2023-11-08 ASSESSMENT — LIFESTYLE VARIABLES
AUDIT-C TOTAL SCORE: 0
AUDIT-C TOTAL SCORE: 0
HOW OFTEN DO YOU HAVE A DRINK CONTAINING ALCOHOL: NEVER
HOW MANY STANDARD DRINKS CONTAINING ALCOHOL DO YOU HAVE ON A TYPICAL DAY: PATIENT DOES NOT DRINK
SKIP TO QUESTIONS 9-10: 1
HOW OFTEN DO YOU HAVE 6 OR MORE DRINKS ON ONE OCCASION: NEVER

## 2023-11-08 ASSESSMENT — PATIENT HEALTH QUESTIONNAIRE - PHQ9
SUM OF ALL RESPONSES TO PHQ9 QUESTIONS 1 & 2: 0
2. FEELING DOWN, DEPRESSED OR HOPELESS: NOT AT ALL
1. LITTLE INTEREST OR PLEASURE IN DOING THINGS: NOT AT ALL

## 2023-11-08 ASSESSMENT — PAIN SCALES - GENERAL: PAINLEVEL_OUTOF10: 0 - NO PAIN

## 2023-11-09 ENCOUNTER — TELEPHONE (OUTPATIENT)
Dept: OBSTETRICS AND GYNECOLOGY | Facility: CLINIC | Age: 28
End: 2023-11-09
Payer: COMMERCIAL

## 2023-11-09 ENCOUNTER — ANESTHESIA EVENT (OUTPATIENT)
Dept: OBSTETRICS AND GYNECOLOGY | Facility: HOSPITAL | Age: 28
End: 2023-11-09
Payer: COMMERCIAL

## 2023-11-09 ENCOUNTER — ANESTHESIA (OUTPATIENT)
Dept: OBSTETRICS AND GYNECOLOGY | Facility: HOSPITAL | Age: 28
End: 2023-11-09
Payer: COMMERCIAL

## 2023-11-09 PROBLEM — Z34.90 ENCOUNTER FOR INDUCTION OF LABOR (HHS-HCC): Status: ACTIVE | Noted: 2023-11-09

## 2023-11-09 LAB — T PALLIDUM AB SER QL: NONREACTIVE

## 2023-11-09 PROCEDURE — 51701 INSERT BLADDER CATHETER: CPT

## 2023-11-09 PROCEDURE — 88307 TISSUE EXAM BY PATHOLOGIST: CPT | Mod: TC,SUR,AHULAB | Performed by: OBSTETRICS & GYNECOLOGY

## 2023-11-09 PROCEDURE — 59515 CESAREAN DELIVERY: CPT | Performed by: OBSTETRICS & GYNECOLOGY

## 2023-11-09 PROCEDURE — 2500000004 HC RX 250 GENERAL PHARMACY W/ HCPCS (ALT 636 FOR OP/ED): Performed by: OBSTETRICS & GYNECOLOGY

## 2023-11-09 PROCEDURE — 3700000018 HC OB ANESTHESIA C-SECTION: Performed by: OBSTETRICS & GYNECOLOGY

## 2023-11-09 PROCEDURE — 7100000016 HC LABOR RECOVERY PER HOUR: Performed by: OBSTETRICS & GYNECOLOGY

## 2023-11-09 PROCEDURE — 2500000001 HC RX 250 WO HCPCS SELF ADMINISTERED DRUGS (ALT 637 FOR MEDICARE OP): Performed by: NURSE ANESTHETIST, CERTIFIED REGISTERED

## 2023-11-09 PROCEDURE — 2500000005 HC RX 250 GENERAL PHARMACY W/O HCPCS: Performed by: NURSE ANESTHETIST, CERTIFIED REGISTERED

## 2023-11-09 PROCEDURE — 3E0P7VZ INTRODUCTION OF HORMONE INTO FEMALE REPRODUCTIVE, VIA NATURAL OR ARTIFICIAL OPENING: ICD-10-PCS | Performed by: OBSTETRICS & GYNECOLOGY

## 2023-11-09 PROCEDURE — 2500000004 HC RX 250 GENERAL PHARMACY W/ HCPCS (ALT 636 FOR OP/ED)

## 2023-11-09 PROCEDURE — 88307 TISSUE EXAM BY PATHOLOGIST: CPT | Mod: TC,AHULAB | Performed by: OBSTETRICS & GYNECOLOGY

## 2023-11-09 PROCEDURE — 2500000004 HC RX 250 GENERAL PHARMACY W/ HCPCS (ALT 636 FOR OP/ED): Performed by: NURSE ANESTHETIST, CERTIFIED REGISTERED

## 2023-11-09 PROCEDURE — 10907ZC DRAINAGE OF AMNIOTIC FLUID, THERAPEUTIC FROM PRODUCTS OF CONCEPTION, VIA NATURAL OR ARTIFICIAL OPENING: ICD-10-PCS | Performed by: ADVANCED PRACTICE MIDWIFE

## 2023-11-09 PROCEDURE — 88307 TISSUE EXAM BY PATHOLOGIST: CPT | Performed by: PATHOLOGY

## 2023-11-09 PROCEDURE — 2500000004 HC RX 250 GENERAL PHARMACY W/ HCPCS (ALT 636 FOR OP/ED): Performed by: ADVANCED PRACTICE MIDWIFE

## 2023-11-09 PROCEDURE — 59514 CESAREAN DELIVERY ONLY: CPT | Performed by: OBSTETRICS & GYNECOLOGY

## 2023-11-09 PROCEDURE — 1100000001 HC PRIVATE ROOM DAILY

## 2023-11-09 RX ORDER — SIMETHICONE 80 MG
80 TABLET,CHEWABLE ORAL 4 TIMES DAILY PRN
Status: DISCONTINUED | OUTPATIENT
Start: 2023-11-09 | End: 2023-11-12 | Stop reason: HOSPADM

## 2023-11-09 RX ORDER — HYDROMORPHONE HYDROCHLORIDE 1 MG/ML
0.2 INJECTION, SOLUTION INTRAMUSCULAR; INTRAVENOUS; SUBCUTANEOUS EVERY 5 MIN PRN
Status: DISCONTINUED | OUTPATIENT
Start: 2023-11-09 | End: 2023-11-12 | Stop reason: HOSPADM

## 2023-11-09 RX ORDER — LABETALOL HYDROCHLORIDE 5 MG/ML
20 INJECTION, SOLUTION INTRAVENOUS ONCE AS NEEDED
Status: DISCONTINUED | OUTPATIENT
Start: 2023-11-09 | End: 2023-11-12 | Stop reason: HOSPADM

## 2023-11-09 RX ORDER — FENTANYL/ROPIVACAINE/NS/PF 2MCG/ML-.2
0-25 PLASTIC BAG, INJECTION (ML) INJECTION CONTINUOUS
Status: DISCONTINUED | OUTPATIENT
Start: 2023-11-09 | End: 2023-11-10

## 2023-11-09 RX ORDER — SODIUM CITRATE AND CITRIC ACID MONOHYDRATE 334; 500 MG/5ML; MG/5ML
SOLUTION ORAL AS NEEDED
Status: DISCONTINUED | OUTPATIENT
Start: 2023-11-09 | End: 2023-11-09

## 2023-11-09 RX ORDER — ENOXAPARIN SODIUM 100 MG/ML
40 INJECTION SUBCUTANEOUS EVERY 24 HOURS
Status: DISCONTINUED | OUTPATIENT
Start: 2023-11-10 | End: 2023-11-12 | Stop reason: HOSPADM

## 2023-11-09 RX ORDER — METHYLERGONOVINE MALEATE 0.2 MG/ML
0.2 INJECTION INTRAVENOUS ONCE AS NEEDED
Status: DISCONTINUED | OUTPATIENT
Start: 2023-11-09 | End: 2023-11-12 | Stop reason: HOSPADM

## 2023-11-09 RX ORDER — LIDOCAINE 560 MG/1
1 PATCH PERCUTANEOUS; TOPICAL; TRANSDERMAL
Status: DISCONTINUED | OUTPATIENT
Start: 2023-11-09 | End: 2023-11-12 | Stop reason: HOSPADM

## 2023-11-09 RX ORDER — ONDANSETRON HYDROCHLORIDE 2 MG/ML
4 INJECTION, SOLUTION INTRAVENOUS EVERY 6 HOURS PRN
Status: DISCONTINUED | OUTPATIENT
Start: 2023-11-09 | End: 2023-11-12 | Stop reason: HOSPADM

## 2023-11-09 RX ORDER — ONDANSETRON 4 MG/1
4 TABLET, FILM COATED ORAL EVERY 6 HOURS PRN
Status: DISCONTINUED | OUTPATIENT
Start: 2023-11-09 | End: 2023-11-12 | Stop reason: HOSPADM

## 2023-11-09 RX ORDER — NIFEDIPINE 10 MG/1
10 CAPSULE ORAL ONCE AS NEEDED
Status: DISCONTINUED | OUTPATIENT
Start: 2023-11-09 | End: 2023-11-12 | Stop reason: HOSPADM

## 2023-11-09 RX ORDER — OXYTOCIN 10 [USP'U]/ML
10 INJECTION, SOLUTION INTRAMUSCULAR; INTRAVENOUS ONCE AS NEEDED
Status: DISCONTINUED | OUTPATIENT
Start: 2023-11-09 | End: 2023-11-12 | Stop reason: HOSPADM

## 2023-11-09 RX ORDER — NALOXONE HYDROCHLORIDE 0.4 MG/ML
0.1 INJECTION, SOLUTION INTRAMUSCULAR; INTRAVENOUS; SUBCUTANEOUS EVERY 5 MIN PRN
Status: DISCONTINUED | OUTPATIENT
Start: 2023-11-09 | End: 2023-11-12 | Stop reason: HOSPADM

## 2023-11-09 RX ORDER — MORPHINE SULFATE 0.5 MG/ML
INJECTION, SOLUTION EPIDURAL; INTRATHECAL; INTRAVENOUS AS NEEDED
Status: DISCONTINUED | OUTPATIENT
Start: 2023-11-09 | End: 2023-11-09

## 2023-11-09 RX ORDER — ADHESIVE BANDAGE
10 BANDAGE TOPICAL
Status: DISCONTINUED | OUTPATIENT
Start: 2023-11-09 | End: 2023-11-12 | Stop reason: HOSPADM

## 2023-11-09 RX ORDER — IBUPROFEN 600 MG/1
600 TABLET ORAL EVERY 6 HOURS
Status: DISCONTINUED | OUTPATIENT
Start: 2023-11-10 | End: 2023-11-12 | Stop reason: HOSPADM

## 2023-11-09 RX ORDER — DIPHENHYDRAMINE HYDROCHLORIDE 50 MG/ML
25 INJECTION INTRAMUSCULAR; INTRAVENOUS EVERY 4 HOURS PRN
Status: DISCONTINUED | OUTPATIENT
Start: 2023-11-09 | End: 2023-11-12 | Stop reason: HOSPADM

## 2023-11-09 RX ORDER — OXYCODONE HYDROCHLORIDE 5 MG/1
10 TABLET ORAL EVERY 4 HOURS PRN
Status: DISCONTINUED | OUTPATIENT
Start: 2023-11-10 | End: 2023-11-12 | Stop reason: HOSPADM

## 2023-11-09 RX ORDER — ACETAMINOPHEN 325 MG/1
975 TABLET ORAL EVERY 6 HOURS
Status: DISCONTINUED | OUTPATIENT
Start: 2023-11-09 | End: 2023-11-12 | Stop reason: HOSPADM

## 2023-11-09 RX ORDER — ACETAMINOPHEN 120 MG/1
SUPPOSITORY RECTAL AS NEEDED
Status: DISCONTINUED | OUTPATIENT
Start: 2023-11-09 | End: 2023-11-09

## 2023-11-09 RX ORDER — LIDOCAINE HCL/EPINEPHRINE/PF 2%-1:200K
VIAL (ML) INJECTION AS NEEDED
Status: DISCONTINUED | OUTPATIENT
Start: 2023-11-09 | End: 2023-11-09

## 2023-11-09 RX ORDER — HYDRALAZINE HYDROCHLORIDE 20 MG/ML
5 INJECTION INTRAMUSCULAR; INTRAVENOUS ONCE AS NEEDED
Status: DISCONTINUED | OUTPATIENT
Start: 2023-11-09 | End: 2023-11-12 | Stop reason: HOSPADM

## 2023-11-09 RX ORDER — KETOROLAC TROMETHAMINE 30 MG/ML
INJECTION, SOLUTION INTRAMUSCULAR; INTRAVENOUS AS NEEDED
Status: DISCONTINUED | OUTPATIENT
Start: 2023-11-09 | End: 2023-11-09

## 2023-11-09 RX ORDER — DIPHENHYDRAMINE HCL 25 MG
25 CAPSULE ORAL EVERY 4 HOURS PRN
Status: DISCONTINUED | OUTPATIENT
Start: 2023-11-09 | End: 2023-11-12 | Stop reason: HOSPADM

## 2023-11-09 RX ORDER — OXYTOCIN/0.9 % SODIUM CHLORIDE 30/500 ML
60 PLASTIC BAG, INJECTION (ML) INTRAVENOUS ONCE AS NEEDED
Status: DISCONTINUED | OUTPATIENT
Start: 2023-11-09 | End: 2023-11-12 | Stop reason: HOSPADM

## 2023-11-09 RX ORDER — CEFAZOLIN SODIUM 1 G/50ML
SOLUTION INTRAVENOUS AS NEEDED
Status: DISCONTINUED | OUTPATIENT
Start: 2023-11-09 | End: 2023-11-09

## 2023-11-09 RX ORDER — NALBUPHINE HYDROCHLORIDE 10 MG/ML
5 INJECTION, SOLUTION INTRAMUSCULAR; INTRAVENOUS; SUBCUTANEOUS
Status: DISPENSED | OUTPATIENT
Start: 2023-11-09 | End: 2023-11-10

## 2023-11-09 RX ORDER — MISOPROSTOL 200 UG/1
800 TABLET ORAL ONCE AS NEEDED
Status: DISCONTINUED | OUTPATIENT
Start: 2023-11-09 | End: 2023-11-12 | Stop reason: HOSPADM

## 2023-11-09 RX ORDER — TRANEXAMIC ACID 100 MG/ML
1000 INJECTION, SOLUTION INTRAVENOUS ONCE AS NEEDED
Status: DISCONTINUED | OUTPATIENT
Start: 2023-11-09 | End: 2023-11-12 | Stop reason: HOSPADM

## 2023-11-09 RX ORDER — CARBOPROST TROMETHAMINE 250 UG/ML
250 INJECTION, SOLUTION INTRAMUSCULAR ONCE AS NEEDED
Status: DISCONTINUED | OUTPATIENT
Start: 2023-11-09 | End: 2023-11-12 | Stop reason: HOSPADM

## 2023-11-09 RX ORDER — LOPERAMIDE HYDROCHLORIDE 2 MG/1
4 CAPSULE ORAL EVERY 2 HOUR PRN
Status: DISCONTINUED | OUTPATIENT
Start: 2023-11-09 | End: 2023-11-12 | Stop reason: HOSPADM

## 2023-11-09 RX ORDER — FAMOTIDINE 10 MG/ML
INJECTION INTRAVENOUS AS NEEDED
Status: DISCONTINUED | OUTPATIENT
Start: 2023-11-09 | End: 2023-11-09

## 2023-11-09 RX ORDER — POLYETHYLENE GLYCOL 3350 17 G/17G
17 POWDER, FOR SOLUTION ORAL 2 TIMES DAILY PRN
Status: DISCONTINUED | OUTPATIENT
Start: 2023-11-09 | End: 2023-11-12 | Stop reason: HOSPADM

## 2023-11-09 RX ORDER — OXYCODONE HYDROCHLORIDE 5 MG/1
5 TABLET ORAL EVERY 4 HOURS PRN
Status: DISCONTINUED | OUTPATIENT
Start: 2023-11-10 | End: 2023-11-12 | Stop reason: HOSPADM

## 2023-11-09 RX ORDER — BISACODYL 10 MG/1
10 SUPPOSITORY RECTAL DAILY PRN
Status: DISCONTINUED | OUTPATIENT
Start: 2023-11-09 | End: 2023-11-12 | Stop reason: HOSPADM

## 2023-11-09 RX ORDER — CEFAZOLIN 1 G/1
INJECTION, POWDER, FOR SOLUTION INTRAVENOUS AS NEEDED
Status: DISCONTINUED | OUTPATIENT
Start: 2023-11-09 | End: 2023-11-09

## 2023-11-09 RX ORDER — KETOROLAC TROMETHAMINE 30 MG/ML
30 INJECTION, SOLUTION INTRAMUSCULAR; INTRAVENOUS EVERY 6 HOURS
Status: COMPLETED | OUTPATIENT
Start: 2023-11-09 | End: 2023-11-10

## 2023-11-09 RX ORDER — OXYTOCIN/0.9 % SODIUM CHLORIDE 30/500 ML
2-30 PLASTIC BAG, INJECTION (ML) INTRAVENOUS CONTINUOUS
Status: DISCONTINUED | OUTPATIENT
Start: 2023-11-09 | End: 2023-11-10

## 2023-11-09 RX ADMIN — Medication 2 MILLI-UNITS/MIN: at 14:33

## 2023-11-09 RX ADMIN — Medication 60 MILLI-UNITS/MIN: at 21:32

## 2023-11-09 RX ADMIN — KETOROLAC TROMETHAMINE 30 MG: 30 INJECTION, SOLUTION INTRAMUSCULAR; INTRAVENOUS at 22:08

## 2023-11-09 RX ADMIN — Medication 10 ML/HR: at 13:42

## 2023-11-09 RX ADMIN — ACETAMINOPHEN 650 MG: 120 SUPPOSITORY RECTAL at 22:14

## 2023-11-09 RX ADMIN — FAMOTIDINE 20 MG: 10 INJECTION, SOLUTION INTRAVENOUS at 20:50

## 2023-11-09 RX ADMIN — Medication 5 ML: at 13:40

## 2023-11-09 RX ADMIN — LIDOCAINE HYDROCHLORIDE,EPINEPHRINE BITARTRATE 5 ML: 20; .005 INJECTION, SOLUTION EPIDURAL; INFILTRATION; INTRACAUDAL; PERINEURAL at 21:05

## 2023-11-09 RX ADMIN — Medication 5 ML: at 13:35

## 2023-11-09 RX ADMIN — SODIUM CHLORIDE, POTASSIUM CHLORIDE, SODIUM LACTATE AND CALCIUM CHLORIDE 125 ML/HR: 600; 310; 30; 20 INJECTION, SOLUTION INTRAVENOUS at 13:46

## 2023-11-09 RX ADMIN — SODIUM CITRATE AND CITRIC ACID MONOHYDRATE 30 ML: 500; 334 SOLUTION ORAL at 20:46

## 2023-11-09 RX ADMIN — METOCLOPRAMIDE 10 MG: 5 INJECTION, SOLUTION INTRAMUSCULAR; INTRAVENOUS at 20:47

## 2023-11-09 RX ADMIN — LIDOCAINE HYDROCHLORIDE,EPINEPHRINE BITARTRATE 5 ML: 20; .005 INJECTION, SOLUTION EPIDURAL; INFILTRATION; INTRACAUDAL; PERINEURAL at 21:09

## 2023-11-09 RX ADMIN — MISOPROSTOL 25 MCG: 100 TABLET ORAL at 02:46

## 2023-11-09 RX ADMIN — SODIUM CHLORIDE, POTASSIUM CHLORIDE, SODIUM LACTATE AND CALCIUM CHLORIDE 500 ML: 600; 310; 30; 20 INJECTION, SOLUTION INTRAVENOUS at 01:35

## 2023-11-09 RX ADMIN — SODIUM CHLORIDE, POTASSIUM CHLORIDE, SODIUM LACTATE AND CALCIUM CHLORIDE 125 ML/HR: 600; 310; 30; 20 INJECTION, SOLUTION INTRAVENOUS at 02:43

## 2023-11-09 RX ADMIN — CEFAZOLIN SODIUM 2 G: 1 INJECTION, SOLUTION INTRAVENOUS at 21:15

## 2023-11-09 RX ADMIN — LIDOCAINE HYDROCHLORIDE,EPINEPHRINE BITARTRATE 5 ML: 20; .005 INJECTION, SOLUTION EPIDURAL; INFILTRATION; INTRACAUDAL; PERINEURAL at 21:00

## 2023-11-09 RX ADMIN — ONDANSETRON 4 MG: 2 INJECTION INTRAMUSCULAR; INTRAVENOUS at 21:39

## 2023-11-09 RX ADMIN — MORPHINE SULFATE 3 MG: 0.5 INJECTION, SOLUTION EPIDURAL; INTRATHECAL; INTRAVENOUS at 21:45

## 2023-11-09 SDOH — HEALTH STABILITY: MENTAL HEALTH: CURRENT SMOKER: 0

## 2023-11-09 ASSESSMENT — PAIN SCALES - GENERAL
PAINLEVEL_OUTOF10: 0 - NO PAIN
PAINLEVEL_OUTOF10: 5 - MODERATE PAIN
PAINLEVEL_OUTOF10: 4
PAINLEVEL_OUTOF10: 0 - NO PAIN
PAINLEVEL_OUTOF10: 4
PAINLEVEL_OUTOF10: 0 - NO PAIN
PAINLEVEL_OUTOF10: 0 - NO PAIN
PAINLEVEL_OUTOF10: 4
PAINLEVEL_OUTOF10: 0 - NO PAIN
PAINLEVEL_OUTOF10: 3
PAIN_LEVEL: 0
PAINLEVEL_OUTOF10: 0 - NO PAIN

## 2023-11-09 NOTE — PROGRESS NOTES
Intrapartum Progress Note    Assessment/Plan   Lakeisha Edgar is a 28 y.o.  at 40w5d. DAVID: 2023, by Last Menstrual Period.     Patient mildly uncomfortable with contractions.  Tracing: Patient has had intermittent variable and late decelerations, has maintained moderate variability.  The patient has received a fluid bolus and reposition.  The past half hour has had no late decelerations, moderate variability, positive accelerations, currently category 1 tracing.  Patient is having irregular contractions.  Cervix: Closed/70%/-3  Discussed tracing with the patient and options of continued observation, or repeating Cytotec    PLAN:  Cytotec No. 2 inserted

## 2023-11-09 NOTE — SIGNIFICANT EVENT
Pt extremely afraid of exams as they have been very painful. Was amenable to ve with attempt at crb but even gently entry was too much for pt to tolerate.  We discussed the possibility of an early epidural after breakfast and then moving forward with the uncomfortable interventions necessary for a labor induction. Pt agreeable to this plan. Fht at this time category 1.    rupali Caban

## 2023-11-09 NOTE — ANESTHESIA PROCEDURE NOTES
Epidural Block    Patient location during procedure: OB  Start time: 11/9/2023 1:09 PM  End time: 11/9/2023 1:45 PM  Reason for block: labor analgesia  Staffing  Performed: CRNA   Authorized by: JENN Sanchez    Performed by: JENN Sanchez    Preanesthetic Checklist  Completed: patient identified, IV checked, risks and benefits discussed, surgical consent, pre-op evaluation, timeout performed and sterile techniques followed  Block Timeout  RN/Licensed healthcare professional reads aloud to the Anesthesia provider and entire team: Patient identity, procedure with side and site, patient position, and as applicable the availability of implants/special equipment/special requirements.  Patient on coagulant treatment: no  Timeout performed at: 11/9/2023 1:12 PM  Block Placement  Patient position: sitting  Prep: ChloraPrep  Sterility prep: drape, gloves, hand, mask and cap  Sedation level: no sedation  Patient monitoring: blood pressure, continuous pulse oximetry, EKG and heart rate  Approach: midline  Local numbing: lidocaine 1% to skin and subcutaneous tissues  Vertebral space: lumbar  Lumbar location: L3-L4  Epidural  Loss of resistance technique: saline  Guidance: landmark technique        Needle  Needle type: Tuohy   Needle gauge: 17  Needle length: 10 cm  Needle insertion depth: 4 cm  Catheter type: multi-orifice  Catheter size: 20 G  Catheter at skin depth: 11 cm  Catheter securement method: clear occlusive dressing and liquid medical adhesive    Test dose: lidocaine 1.5% with epinephrine 1-to-200,000  Test dose given at 11/9/2023 1:22 PM  Test dose: lidocaine 1.5% with epinephrine 1-to-200,000  Test dose result: no positive test dose    PCEA  Medication concentration used: 0.2% Ropivacaine with 2 mcg/mL Fentanyl  Dose (mL): 5  Lockout (minutes): 15  1-Hour Limit (boluses/hr): 20  Basal Rate: 10    PIEB  PIEB Bolus (mL): 5  PIEB Bolus Interval (minutes): 15  PCEA Bolus (mL): 5  PCEA Lockout  Time (minutes): 15  Maximum PCEA Bolus/Hr: 4    Assessment  Sensory level: T6 bilateral  Block outcome: patient comfortable  Number of attempts: 1  Events: no positive test dose  Procedure assessment: patient tolerated procedure well with no immediate complications

## 2023-11-09 NOTE — NURSING NOTE
[id number]: Received report from Jacque SERRANO  [id number]: Pt up to restroom  0814: Silvia Fuller /rupali at bedside for VE and to discuss POC. Attempted to place CRB; unable.   [id number]: Pt up to restroom  [id number]: Pt up to restroom  1103: Silvia Fuller CNM at bedside to discuss plan of care with patient  - Pt agreed to proceed with epidural but asked if she could shower before hand.   -Silvia Fuller CNM approved pt to be off monitor to shower   [id number]: Pt requested epidural; up to restroom  1225: Started LR bolus per GIANNA Chi orders   1302: Notified anesthesia pt requested epidural  1307: Anesthesia at bedside; continuous pulse ox applied; pt placed in position  1311: Anesthesia performed timeout  1330: Pt tolerated anesthesia well; placed in prone position with left tilt   1404: GIANNA Fuller CNM at bedside for cervical exam and AROM.   1433: Pitocin started  1618: Emptied bladder; repositioned

## 2023-11-09 NOTE — H&P
Obstetrical Admission History and Physical     Lakeisha Edgar is a 28 y.o.  at 40w5d. ADVID: 2023, by Last Menstrual Period. Estimated fetal weight: 8 pounds. She has had prenatal care with Dr. Pringle .    Chief Complaint: Scheduled Induction (Post dates)    Assessment/Plan    POSTDATES: IUP at 40.4 weeks admitted last night for induction.  Patient with category 1 tracing, cervix closed/70%/-3.  Vertex by ultrasound.  GBS negative.  Discussed options of induction with the patient including Pitocin, Cytotec, CRB, and AROM.  We will proceed with Cytotec, Cytotec No. 1 inserted at 9:45 PM.    Active Problems:    First pregnancy, third trimester    40 weeks gestation of pregnancy      Pregnancy Problems (from 23 to present)       Problem Noted Resolved    40 weeks gestation of pregnancy 10/10/2023 by Vitor Pringle MD No    Priority:  Medium      Overview Addendum 10/29/2023  6:01 AM by Vitor Pringle MD     Patient on ASA prophylaxis  Elevated 1 hour glucose, normal 3-hour OGTT  Last ultrasound on : 31.5 weeks, AGA at 71st percentile  GBS negative 10/10         First pregnancy, third trimester 2023 by Michel Mosleey RN No    Priority:  Medium            Options for delivery have been discussed with the patient and she elects for an induction of labor.  Cervical ripening with cytotec, cervidil, other prostaglandin agents has been discussed.  Induction of labor with pitocin, amniotomy, cytotec, and cervical balloon have been discussed in detail. The risks, benefits, complications, alternatives, expected outcomes, potential problems during recuperation and recovery, and the risks of not performing the procedure were discussed with the patient. The patient stated understanding that the risks of delivery include, but are not limited to: death; reaction to medications; injury to bowel, bladder, ureters, uterus, cervix, vagina, and other pelvic and abdominal structures, infection;  blood loss and possible need for transfusion; and potential need for surgery, including hysterectomy. The risks of injury to the infant during delivery were also discussed. All questions were answered. There was concurrence with the planned procedure, and the patient wanted to proceed.    Admit to inpatient status. I anticipate that this patient will require a stay exceeding at least 2 midnights for delivery and postpartum.  Induction of labor.  Management of pregnancy complications, as indicated.    Subjective   Good fetal movement. Denies vaginal bleeding., Denies contractions., Denies leaking of fluid.       Reason for Induction of Labor:  Postdates pregnancy         Obstetrical History   OB History    Para Term  AB Living   3 0 0 0 2 0   SAB IAB Ectopic Multiple Live Births   0 2 0 0 0      # Outcome Date GA Lbr Dane/2nd Weight Sex Delivery Anes PTL Lv   3 Current            2 IAB            1 IAB                Past Medical History  Past Medical History:   Diagnosis Date    Anemia in pregnancy         Past Surgical History   Past Surgical History:   Procedure Laterality Date    UMBILICAL HERNIA REPAIR      UMBILICAL HERNIA REPAIR  1995    WISDOM TOOTH EXTRACTION N/A        Social History  Social History     Tobacco Use    Smoking status: Former     Types: Cigarettes    Smokeless tobacco: Never   Substance Use Topics    Alcohol use: Not Currently     Substance and Sexual Activity   Drug Use Never       Allergies  Patient has no known allergies.     Medications  Medications Prior to Admission   Medication Sig Dispense Refill Last Dose    acetaminophen (Tylenol) 325 mg suppository Insert into the rectum.       aspirin 81 mg EC tablet Take 1 tablet (81 mg) by mouth once daily.   2023    docusate sodium (Colace) 100 mg capsule Take 1 capsule (100 mg) by mouth 2 times a day.   2023    ferrous sulfate 325 (65 Fe) MG tablet Take 1 tablet (65 mg of iron) by mouth once daily with a meal.    11/7/2023    magnesium, amino acid chelate, 133 mg tablet Take 1 tablet (133 mg) by mouth 2 times a day.   Past Week    PNV no.95/ferrous fum/folic ac (PRENATAL ORAL) Take 1 tablet by mouth once daily.   11/7/2023    polyethylene glycol (Glycolax, Miralax) 17 gram/dose powder Take 17 g by mouth once daily.   11/8/2023 at 1130    pyridoxine (Vitamin B-6) 100 mg tablet Take 1 tablet (100 mg) by mouth once daily.       Sleep Aid, doxylamine, 25 mg tablet Take 1 tablet (25 mg) by mouth once daily at bedtime.          Objective    Last Vitals  Temp Pulse Resp BP MAP O2 Sat   36.7 °C (98.1 °F) 88 16 114/65   98 %     Physical Examination  GENERAL: Examination reveals a well developed, well nourished, gravid female in no acute distress. She is alert and cooperative.  NECK: supple, no significant adenopathy  LUNGS: clear to auscultation bilaterally  HEART: regular rate and rhythm, S1, S2 normal, no murmur, click, rub or gallop  ABDOMEN: soft, gravid, nontender, nondistended, no abnormal masses, no epigastric pain  FHR is  , with Variable decelerations, and a Category I tracing.    The fetus is in a vertex presentation, determined by ultrasound, vaginal exam, and Leopold's maneuver  Current Estimated Fetal Weight 8 pounds established by Leopold's maneuver  CERVIX: Closed cm dilated, 70 % effaced, -3 station; MEMBRANES are Intact  PSYCHOLOGICAL: awake and alert; oriented to person, place, and time    Lab Review  Labs in chart were reviewed.

## 2023-11-09 NOTE — TELEPHONE ENCOUNTER
----- Message from Ana Ledesma sent at 2023  7:02 AM EST -----  Regarding: RE: Induction request  She is scheduled for  at 8pm. I did not notify her  ----- Message -----  From: Cayla Webber RN  Sent: 2023   9:29 AM EST  To: Ana Ledesma  Subject: Induction request                                Patient has DAVID of 23, is now 40w4d. . Needs scheduled for induction at [facility] L&D anytime between today and 11/10/23 in order to ensure pt is induced before 41 weeks. BMI 33. Order is in. Thanks!

## 2023-11-09 NOTE — ANESTHESIA PREPROCEDURE EVALUATION
Patient: Lakeisha Edgar    Evaluation Method: In-person visit    Procedure Information    Date: 11/09/23  Procedure: Labor Analgesia         Relevant Problems   Anesthesia (within normal limits)      Cardiovascular (within normal limits)      Endocrine (within normal limits)      GI (within normal limits)      /Renal (within normal limits)      Neuro/Psych (within normal limits)      Pulmonary (within normal limits)      GI/Hepatic (within normal limits)      Hematology   (+) Anemia of pregnancy      Musculoskeletal (within normal limits)      Eyes, Ears, Nose, and Throat (within normal limits)      Infectious Disease (within normal limits)       Clinical information reviewed:   Tobacco  Allergies  Meds  Problems     Fam Hx          NPO Detail:  No data recorded     OB/Gyn Evaluation    Present Pregnancy    Patient is pregnant now.   Obstetric History                Physical Exam    Airway  Mallampati: III  TM distance: >3 FB  Neck ROM: full     Cardiovascular   Rhythm: regular  Rate: normal     Dental        Pulmonary - normal exam     Abdominal            Anesthesia Plan    ASA 2     epidural     The patient is not a current smoker.  Patient did not smoke on day of procedure.    Anesthetic plan and risks discussed with patient.  Use of blood products discussed with patient who consented to blood products.    Plan discussed with CRNA.

## 2023-11-09 NOTE — PROGRESS NOTES
Intrapartum Progress Note    Assessment   Lakeisha Edgar is a 28 y.o.  at 40w5d  IOL, cervical ripening phase  Cat 2 fht     Plan   Pt reports she will get epidural and we can then proceed with ve/crb placement  Cefm, position changes and iv fluids    Subjective   For several hours pt has been very reticent about interventions to promote labor. Ve's are difficult for her. We were unable to get crb in. Pt had originally agreed to epidural and re-try but then wanted to wait. She thought we might re-try ve/crb before epidural because she was more scared of the epidural than the procedure, however, I told her that if I was going to cause her the same amount of pain I would not proceed. Upon reconsideration pt wanted to shower then get the epidural. After the shower she reported that she would like to order lunch before epidural. At this time we are waiting on lunch to be delivered. Pt's current plan is to then get the epidural and re-try exam and possibly crb.  At this time pt is beginning to be more uncomfortable with the contractions she's feeling.      Objective   Last Vitals:  Temp Pulse Resp BP MAP Pulse Ox   36.7 °C (98.1 °F) 94 16 125/86   98 %     Physical Examination:   min angela, no accels, no decels  [place]: q 3-6  VE:

## 2023-11-09 NOTE — PROGRESS NOTES
Intrapartum Progress Note    Assessment   Lakeisha Edgar is a 28 y.o.  at 40w5d  IOL, latent phase, 5hrs s/p rom & pit  Fht category 2    Plan   Shut pit for now. Consider restart vs. Recommending c/s for nrfht  Dr Cardozo aware. Will report status to oncoming team      Subjective   Pt comfortable with epidural    Objective   Last Vitals:  Temp Pulse Resp BP MAP Pulse Ox   36.4 °C (97.5 °F) 82 18 114/68   99 %     Physical Examination:  , mod angela, +accels, +decels--two variable decels [id number] in the past 5 minutes, not readily responsive to position changes  [place]: q 2-4, pit @8mu/min  VE: def'd

## 2023-11-09 NOTE — PROGRESS NOTES
Obstetrical Admission History and Physical     Lakeisha Edgar is a 28 y.o.  at 40w4d. DAVID: 2023, by Last Menstrual Period. Estimated fetal weight: 8 pounds. She has had prenatal care with Dr. Pringle .    Chief Complaint: Scheduled Induction (Post dates)    Assessment/Plan    POST DATES: IUP at 40.4 weeks here for induction.  Patient is GBS negative.  Exam in the office was closed/70%.  Discussed with the patient options of induction including Cytotec, Pitocin, CRB, and AROM.  Will place start with Cytotec.  PLAN: Cytotec No. 1 inserted at 9:15 PM    Active Problems:    First pregnancy, third trimester    40 weeks gestation of pregnancy      Pregnancy Problems (from 23 to present)       Problem Noted Resolved    40 weeks gestation of pregnancy 10/10/2023 by Vitor Pringle MD No    Priority:  Medium      Overview Addendum 10/29/2023  6:01 AM by Vitor Pringle MD     Patient on ASA prophylaxis  Elevated 1 hour glucose, normal 3-hour OGTT  Last ultrasound on : 31.5 weeks, AGA at 71st percentile  GBS negative 10/10         First pregnancy, third trimester 2023 by Michel Moseley RN No    Priority:  Medium            Options for delivery have been discussed with the patient and she elects for an induction of labor.  Cervical ripening with cytotec, cervidil, other prostaglandin agents has been discussed.  Induction of labor with pitocin, amniotomy, cytotec, and cervical balloon have been discussed in detail. The risks, benefits, complications, alternatives, expected outcomes, potential problems during recuperation and recovery, and the risks of not performing the procedure were discussed with the patient. The patient stated understanding that the risks of delivery include, but are not limited to: death; reaction to medications; injury to bowel, bladder, ureters, uterus, cervix, vagina, and other pelvic and abdominal structures, infection; blood loss and possible need for  transfusion; and potential need for surgery, including hysterectomy. The risks of injury to the infant during delivery were also discussed. All questions were answered. There was concurrence with the planned procedure, and the patient wanted to proceed.    Admit to inpatient status. I anticipate that this patient will require a stay exceeding at least 2 midnights for delivery and postpartum.  Induction of labor.  Management of pregnancy complications, as indicated.    Subjective   Good fetal movement. Denies vaginal bleeding., Denies contractions., Denies leaking of fluid.       Reason for Induction of Labor:  Postdates pregnancy         Obstetrical History   OB History    Para Term  AB Living   3 0 0 0 2 0   SAB IAB Ectopic Multiple Live Births   0 2 0 0 0      # Outcome Date GA Lbr Dane/2nd Weight Sex Delivery Anes PTL Lv   3 Current            2 IAB            1 IAB                Past Medical History  Past Medical History:   Diagnosis Date    Anemia in pregnancy         Past Surgical History   Past Surgical History:   Procedure Laterality Date    UMBILICAL HERNIA REPAIR      UMBILICAL HERNIA REPAIR  1995    WISDOM TOOTH EXTRACTION N/A        Social History  Social History     Tobacco Use    Smoking status: Former     Types: Cigarettes    Smokeless tobacco: Never   Substance Use Topics    Alcohol use: Not Currently     Substance and Sexual Activity   Drug Use Never       Allergies  Patient has no known allergies.     Medications  Medications Prior to Admission   Medication Sig Dispense Refill Last Dose    acetaminophen (Tylenol) 325 mg suppository Insert into the rectum.       aspirin 81 mg EC tablet Take 1 tablet (81 mg) by mouth once daily.   2023    docusate sodium (Colace) 100 mg capsule Take 1 capsule (100 mg) by mouth 2 times a day.   2023    ferrous sulfate 325 (65 Fe) MG tablet Take 1 tablet (65 mg of iron) by mouth once daily with a meal.   2023    magnesium, amino acid  chelate, 133 mg tablet Take 1 tablet (133 mg) by mouth 2 times a day.   Past Week    PNV no.95/ferrous fum/folic ac (PRENATAL ORAL) Take 1 tablet by mouth once daily.   11/7/2023    polyethylene glycol (Glycolax, Miralax) 17 gram/dose powder Take 17 g by mouth once daily.   11/8/2023 at 1130    pyridoxine (Vitamin B-6) 100 mg tablet Take 1 tablet (100 mg) by mouth once daily.       Sleep Aid, doxylamine, 25 mg tablet Take 1 tablet (25 mg) by mouth once daily at bedtime.          Objective    Last Vitals  Temp Pulse Resp BP MAP O2 Sat   37 °C (98.6 °F) 91   132/88   98 %     Physical Examination  GENERAL: Examination reveals a well developed, well nourished, gravid female in no acute distress. She is alert and cooperative.  NECK: supple, no significant adenopathy  LUNGS: clear to auscultation bilaterally  HEART: regular rate and rhythm, S1, S2 normal, no murmur, click, rub or gallop  ABDOMEN: soft, gravid, nontender, nondistended, no abnormal masses, no epigastric pain  FHR is 135 , with accelerations , and a reactive  tracing.    The fetus is in a vertex presentation, determined by vaginal exam and Leopold's maneuver  Current Estimated Fetal Weight 8 pounds established by Leopold's maneuver  CERVIX:  0 cm dilated,  70% effaced,  -3 station; MEMBRANES are intact   PSYCHOLOGICAL: awake and alert; oriented to person, place, and time    Lab Review  Labs in chart were reviewed.

## 2023-11-09 NOTE — PROGRESS NOTES
Obstetrical Admission History and Physical     Lakeisha Edgar is a 28 y.o.  at 40w5d. DAVID: 2023, by Last Menstrual Period. Estimated fetal weight: 8 pounds. She has had prenatal care with Dr. Pringle .    Chief Complaint: Scheduled Induction (Post dates)    Assessment/Plan    POST DATES: IUP at 40.4 weeks admitted last night for induction.  Patient cervix was closed/70%/-3, confirmed vertex by exam and ultrasound.  Discussed options of induction with the patient including Pitocin, Cytotec, CRB, and AROM.  Elected to proceed with Cytotec.  Patient with a category 1 tracing  Cytotec No. 1 inserted at 9:15 PM    Active Problems:    First pregnancy, third trimester    40 weeks gestation of pregnancy      Pregnancy Problems (from 23 to present)       Problem Noted Resolved    40 weeks gestation of pregnancy 10/10/2023 by Vitor Pringle MD No    Priority:  Medium      Overview Addendum 10/29/2023  6:01 AM by Vitor Pringle MD     Patient on ASA prophylaxis  Elevated 1 hour glucose, normal 3-hour OGTT  Last ultrasound on : 31.5 weeks, AGA at 71st percentile  GBS negative 10/10         First pregnancy, third trimester 2023 by Michel Moseley RN No    Priority:  Medium            Options for delivery have been discussed with the patient and she elects for an induction of labor.  Cervical ripening with cytotec, cervidil, other prostaglandin agents has been discussed.  Induction of labor with pitocin, amniotomy, cytotec, and cervical balloon have been discussed in detail. The risks, benefits, complications, alternatives, expected outcomes, potential problems during recuperation and recovery, and the risks of not performing the procedure were discussed with the patient. The patient stated understanding that the risks of delivery include, but are not limited to: death; reaction to medications; injury to bowel, bladder, ureters, uterus, cervix, vagina, and other pelvic and abdominal  structures, infection; blood loss and possible need for transfusion; and potential need for surgery, including hysterectomy. The risks of injury to the infant during delivery were also discussed. All questions were answered. There was concurrence with the planned procedure, and the patient wanted to proceed.    Admit to inpatient status. I anticipate that this patient will require a stay exceeding at least 2 midnights for delivery and postpartum.  Induction of labor.  Management of pregnancy complications, as indicated.    Subjective   Good fetal movement. Denies vaginal bleeding., Denies contractions., Denies leaking of fluid.       Reason for Induction of Labor:  Postdates pregnancy         Obstetrical History   OB History    Para Term  AB Living   3 0 0 0 2 0   SAB IAB Ectopic Multiple Live Births   0 2 0 0 0      # Outcome Date GA Lbr Dane/2nd Weight Sex Delivery Anes PTL Lv   3 Current            2 IAB            1 IAB                Past Medical History  Past Medical History:   Diagnosis Date    Anemia in pregnancy         Past Surgical History   Past Surgical History:   Procedure Laterality Date    UMBILICAL HERNIA REPAIR      UMBILICAL HERNIA REPAIR  1995    WISDOM TOOTH EXTRACTION N/A        Social History  Social History     Tobacco Use    Smoking status: Former     Types: Cigarettes    Smokeless tobacco: Never   Substance Use Topics    Alcohol use: Not Currently     Substance and Sexual Activity   Drug Use Never       Allergies  Patient has no known allergies.     Medications  Medications Prior to Admission   Medication Sig Dispense Refill Last Dose    acetaminophen (Tylenol) 325 mg suppository Insert into the rectum.       aspirin 81 mg EC tablet Take 1 tablet (81 mg) by mouth once daily.   2023    docusate sodium (Colace) 100 mg capsule Take 1 capsule (100 mg) by mouth 2 times a day.   2023    ferrous sulfate 325 (65 Fe) MG tablet Take 1 tablet (65 mg of iron) by mouth once  daily with a meal.   11/7/2023    magnesium, amino acid chelate, 133 mg tablet Take 1 tablet (133 mg) by mouth 2 times a day.   Past Week    PNV no.95/ferrous fum/folic ac (PRENATAL ORAL) Take 1 tablet by mouth once daily.   11/7/2023    polyethylene glycol (Glycolax, Miralax) 17 gram/dose powder Take 17 g by mouth once daily.   11/8/2023 at 1130    pyridoxine (Vitamin B-6) 100 mg tablet Take 1 tablet (100 mg) by mouth once daily.       Sleep Aid, doxylamine, 25 mg tablet Take 1 tablet (25 mg) by mouth once daily at bedtime.          Objective    Last Vitals  Temp Pulse Resp BP MAP O2 Sat   36.7 °C (98.1 °F) 94 16 114/65   99 %     Physical Examination  GENERAL: Examination reveals a well developed, well nourished, gravid female in no acute distress. She is alert and cooperative.  NECK: supple, no significant adenopathy  LUNGS: clear to auscultation bilaterally  HEART: regular rate and rhythm, S1, S2 normal, no murmur, click, rub or gallop  ABDOMEN: soft, gravid, nontender, nondistended, no abnormal masses, no epigastric pain  FHR is  , with Variable decelerations, and a Category I tracing.    The fetus is in a vertex presentation, determined by ultrasound, vaginal exam, and Leopold's maneuver  Current Estimated Fetal Weight 8 pounds established by Leopold's maneuver  CERVIX: Closed cm dilated, 70 % effaced, -3 station; MEMBRANES are Intact  PSYCHOLOGICAL: awake and alert; oriented to person, place, and time    Lab Review  Labs in chart were reviewed.

## 2023-11-09 NOTE — PROGRESS NOTES
Intrapartum Progress Note    Assessment   Lakeisha Edgar is a 28 y.o.  at 40w5d  IOL, s/p cervical ripening  Fht category 1  Gbs negative    Plan   Start pitocin per protocol  Cefm  Maternal positioning for optimal fetal rotation and descent  Anticipate         Subjective   Comfortable with epidural    Objective   Last Vitals:  Temp Pulse Resp BP MAP Pulse Ox   36.8 °C (98.2 °F) 89 18 120/77   100 %     Physical Examination:  , mod angela, +accels, no decels  [place]: q 5-7  VE: 5/80/-3 head is well applied to cervix. Arom for msf

## 2023-11-10 PROBLEM — Z34.03 FIRST PREGNANCY, THIRD TRIMESTER (HHS-HCC): Status: RESOLVED | Noted: 2023-09-29 | Resolved: 2023-11-10

## 2023-11-10 PROBLEM — G89.29 CHRONIC RIGHT SHOULDER PAIN: Status: RESOLVED | Noted: 2020-08-01 | Resolved: 2023-11-10

## 2023-11-10 PROBLEM — M25.511 CHRONIC RIGHT SHOULDER PAIN: Status: RESOLVED | Noted: 2020-08-01 | Resolved: 2023-11-10

## 2023-11-10 PROBLEM — E78.2 MIXED HYPERLIPIDEMIA: Status: RESOLVED | Noted: 2020-08-01 | Resolved: 2023-11-10

## 2023-11-10 PROBLEM — Z3A.40 40 WEEKS GESTATION OF PREGNANCY (HHS-HCC): Status: RESOLVED | Noted: 2023-10-10 | Resolved: 2023-11-10

## 2023-11-10 PROBLEM — R63.4 WEIGHT LOSS: Status: RESOLVED | Noted: 2020-08-01 | Resolved: 2023-11-10

## 2023-11-10 PROBLEM — Z34.90 ENCOUNTER FOR INDUCTION OF LABOR (HHS-HCC): Status: RESOLVED | Noted: 2023-11-09 | Resolved: 2023-11-10

## 2023-11-10 PROBLEM — M25.552 PAIN OF LEFT HIP JOINT: Status: RESOLVED | Noted: 2020-08-01 | Resolved: 2023-11-10

## 2023-11-10 LAB
ERYTHROCYTE [DISTWIDTH] IN BLOOD BY AUTOMATED COUNT: 13.1 % (ref 11.5–14.5)
HCT VFR BLD AUTO: 30.6 % (ref 36–46)
HGB BLD-MCNC: 10 G/DL (ref 12–16)
MCH RBC QN AUTO: 32.5 PG (ref 26–34)
MCHC RBC AUTO-ENTMCNC: 32.7 G/DL (ref 32–36)
MCV RBC AUTO: 99 FL (ref 80–100)
NRBC BLD-RTO: 0 /100 WBCS (ref 0–0)
PLATELET # BLD AUTO: 141 X10*3/UL (ref 150–450)
RBC # BLD AUTO: 3.08 X10*6/UL (ref 4–5.2)
WBC # BLD AUTO: 10.9 X10*3/UL (ref 4.4–11.3)

## 2023-11-10 PROCEDURE — 96372 THER/PROPH/DIAG INJ SC/IM: CPT | Performed by: OBSTETRICS & GYNECOLOGY

## 2023-11-10 PROCEDURE — 2500000001 HC RX 250 WO HCPCS SELF ADMINISTERED DRUGS (ALT 637 FOR MEDICARE OP): Performed by: OBSTETRICS & GYNECOLOGY

## 2023-11-10 PROCEDURE — 2500000004 HC RX 250 GENERAL PHARMACY W/ HCPCS (ALT 636 FOR OP/ED): Performed by: OBSTETRICS & GYNECOLOGY

## 2023-11-10 PROCEDURE — 1100000001 HC PRIVATE ROOM DAILY

## 2023-11-10 PROCEDURE — 85027 COMPLETE CBC AUTOMATED: CPT | Performed by: OBSTETRICS & GYNECOLOGY

## 2023-11-10 RX ORDER — METOCLOPRAMIDE HYDROCHLORIDE 5 MG/ML
10 INJECTION INTRAMUSCULAR; INTRAVENOUS ONCE
Status: DISCONTINUED | OUTPATIENT
Start: 2023-11-10 | End: 2023-11-10

## 2023-11-10 RX ORDER — CEFAZOLIN SODIUM 2 G/100ML
2 INJECTION, SOLUTION INTRAVENOUS ONCE
Status: DISCONTINUED | OUTPATIENT
Start: 2023-11-10 | End: 2023-11-10

## 2023-11-10 RX ORDER — FAMOTIDINE 10 MG/ML
20 INJECTION INTRAVENOUS ONCE
Status: DISCONTINUED | OUTPATIENT
Start: 2023-11-10 | End: 2023-11-10

## 2023-11-10 RX ORDER — SODIUM CITRATE AND CITRIC ACID MONOHYDRATE 334; 500 MG/5ML; MG/5ML
30 SOLUTION ORAL ONCE
Status: DISCONTINUED | OUTPATIENT
Start: 2023-11-10 | End: 2023-11-10

## 2023-11-10 RX ADMIN — KETOROLAC TROMETHAMINE 30 MG: 30 INJECTION, SOLUTION INTRAMUSCULAR; INTRAVENOUS at 10:12

## 2023-11-10 RX ADMIN — ENOXAPARIN SODIUM 40 MG: 100 INJECTION SUBCUTANEOUS at 10:13

## 2023-11-10 RX ADMIN — DIPHENHYDRAMINE HYDROCHLORIDE 25 MG: 25 CAPSULE ORAL at 01:36

## 2023-11-10 RX ADMIN — DIPHENHYDRAMINE HYDROCHLORIDE 25 MG: 50 INJECTION INTRAMUSCULAR; INTRAVENOUS at 16:23

## 2023-11-10 RX ADMIN — ACETAMINOPHEN 975 MG: 325 TABLET ORAL at 22:37

## 2023-11-10 RX ADMIN — ACETAMINOPHEN 975 MG: 325 TABLET ORAL at 04:17

## 2023-11-10 RX ADMIN — IBUPROFEN 600 MG: 600 TABLET ORAL at 22:37

## 2023-11-10 RX ADMIN — KETOROLAC TROMETHAMINE 30 MG: 30 INJECTION, SOLUTION INTRAMUSCULAR; INTRAVENOUS at 16:21

## 2023-11-10 RX ADMIN — KETOROLAC TROMETHAMINE 30 MG: 30 INJECTION, SOLUTION INTRAMUSCULAR; INTRAVENOUS at 04:16

## 2023-11-10 RX ADMIN — DIPHENHYDRAMINE HYDROCHLORIDE 25 MG: 25 CAPSULE ORAL at 05:43

## 2023-11-10 RX ADMIN — DIPHENHYDRAMINE HYDROCHLORIDE 25 MG: 25 CAPSULE ORAL at 22:42

## 2023-11-10 RX ADMIN — ACETAMINOPHEN 975 MG: 325 TABLET ORAL at 16:22

## 2023-11-10 RX ADMIN — DIPHENHYDRAMINE HYDROCHLORIDE 25 MG: 50 INJECTION INTRAMUSCULAR; INTRAVENOUS at 11:22

## 2023-11-10 RX ADMIN — ACETAMINOPHEN 975 MG: 325 TABLET ORAL at 10:12

## 2023-11-10 ASSESSMENT — PAIN SCALES - GENERAL
PAINLEVEL_OUTOF10: 6
PAINLEVEL_OUTOF10: 5 - MODERATE PAIN
PAINLEVEL_OUTOF10: 4
PAINLEVEL_OUTOF10: 5 - MODERATE PAIN
PAINLEVEL_OUTOF10: 4
PAINLEVEL_OUTOF10: 5 - MODERATE PAIN
PAINLEVEL_OUTOF10: 4

## 2023-11-10 ASSESSMENT — PAIN - FUNCTIONAL ASSESSMENT
PAIN_FUNCTIONAL_ASSESSMENT: 0-10
PAIN_FUNCTIONAL_ASSESSMENT: 0-10

## 2023-11-10 NOTE — ANESTHESIA POSTPROCEDURE EVALUATION
Patient: Lakeisha Edgar    Procedure Summary       Date: 23 Room / Location: [facility] OB    Anesthesia Start: 1309 Anesthesia Stop:     Procedure: OBGYN Delivery  Section Diagnosis:       (Fetal Intolerance of Labor)      (Failure to Progress)    Surgeons: Jessie Jung MD Responsible Provider: JENN Bernard    Anesthesia Type: epidural ASA Status: 2            Anesthesia Type: epidural    Vitals Value Taken Time   /56 23   Temp 37.7 °C (99.9 °F) 23   Pulse 109 23   Resp 18 23   SpO2 100 % 23       Anesthesia Post Evaluation    Patient location during evaluation: bedside  Patient participation: complete - patient participated  Level of consciousness: awake and alert  Pain score: 0  Pain management: adequate  Multimodal analgesia pain management approach  Airway patency: patent  Two or more strategies used to mitigate risk of obstructive sleep apnea  Cardiovascular status: acceptable  Respiratory status: acceptable  Hydration status: acceptable        No notable events documented.

## 2023-11-10 NOTE — L&D DELIVERY NOTE
OB Delivery Note  2023  Lakeisha Edgar  28 y.o.   , Low Transverse        Gestational Age: 40w5d  /Para:   Quantitative Blood Loss: Admission to Discharge: 860 mL (2023  8:21 PM - 2023 10:22 PM)    Lisa Edgar [[id number]]      Labor Events    Rupture date/time: 2023 1419  Rupture type: Artificial  Fluid color: Meconium  Labor type: Induced Onset of Labor  Labor allowed to proceed with plans for an attempted vaginal birth?: Yes  Induction: Misoprostol, Willett/EASI, Oxytocin  Complications: Fetal Intolerance, Failure to Progress in First Stage       Placenta    Placenta delivery date/time:   Placenta removal: Expressed       Cord    Vessels: 3 vessels  Complications: Nuchal  Nuchal cord description: tight nuchal cord  Number of loops: 2  Delayed cord clamping?: No  Cord blood disposition: Lab  Gases sent?: No  Stem cell collection (by provider): No       Lacerations    Episiotomy: None       Anesthesia    Method: Epidural       Operative Delivery    Forceps attempted?: No  Vacuum extractor attempted?: No        Delivery    Birth date/time: 2023 21:30:00  Delivery type: , Low Transverse   categorization: primary   priority: routine  Indications for : Fetal Intolerance of Labor, Unsuccessful Induction of Labor, Failure to Progress  Incision type: low transverse  Complications: Fetal Intolerance, Failure to Progress in First Stage       Resuscitation    Method: Suctioning, Tactile stimulation       Apgars    Living status: Living  Apgar Component Scores:  1 min.:  5 min.:  10 min.:  15 min.:  20 min.:    Skin color:  1  1       Heart rate:  2  2       Reflex irritability:  2  2       Muscle tone:  2  2       Respiratory effort:  2  2       Total:  9  9       Apgars assigned by: DIPIPPO,RN       Delivery Providers    Delivering clinician: Jessie Jung MD   Provider Role     Delivery Nurse     Nursery Nurse      Resident                 Jessie Jung MD  Operative course:    Patient was brought to the operating room where adequate anesthesia was confirmed. She was prepped and draped in the dorsal supine position with a leftward tilt. Willett catheter was placed. A Pfannenstiel incision was made with a scalpel and carried through to the underlying fascia. Fascia was incised and this incision extended laterally. Rectus muscles were  sharply and bluntly from the fascia. Rectus muscles were  in the midline. Peritoneum was entered sharply and the peritoneal incision expended superiorly and inferiorly. Bladder blade was placed. The vesicouterine peritoneum was incised and the bladder displaced inferiorly and bladder blade replaced.   The lower uterine segment was noted to be well developed. The lower uterine segment was incised with the scalpel and this incision was extended laterally. The infant was noted to be occiput posterior position. The infant's head was brought up through the incision with gentle pressure, followed by the body. Tight nuchal cord X2 was reduced. The infant was bulb suctioned and the cord was clamped and cut. Cord blood was obtained. Placenta was then delivered using gentle traction on the cord and uterine massage.   The uterus was then exteriorized and cleared of clot and debris. The uterine incision was closed in a running locked fashion using 0-Vicryl suture. A second imbricating layer was placed with 0-Vicryl. Hemostasis was assured with additional interrupted sutures of 0-Vicryl. After irrigation and inspection for hemostasis, the uterus was replaced within the abdomen and again inspected for hemostasis.  Peritoneum was closed using 2-0 Vicryl in a running fashion. Rectus muscles were reapproximated using interrupted sutures of 2-0 Vicryl. Fascia was closed using 0-Polysorb. After irrigation the subcutaneous layer was closed with 3-0 Vicryl in a running fashion. Skin was closed  with 4-0 Vicryl in subcuticular fashion. All sponge, needle and instrument counts were correct per nursing. Patient and infant were stable to recovery.

## 2023-11-10 NOTE — PROGRESS NOTES
Intrapartum Progress Note    Assessment/Plan   Lakeisha Edgar is a 28 y.o.  at 40w5d. DAVID: 2023, by Last Menstrual Period.     Induction of labor, latent phase, now 5.5 hr after AROM with meconium noted. Cervix remains 5/80/-3. Reviewed labor course with patient and partner. Reviewed lack of cervical change and inability to maintain pitocin due to category 2 tracing. Options of restarting pitocin vs proceeding with  section were discussed and all questions answered. After weighing pros and cons the patient desires to proceed with unscheduled non-urgent CS for delivery.    Active Problems:    First pregnancy, third trimester    40 weeks gestation of pregnancy    Pregnancy Problems (from 23 to present)       Problem Noted Resolved    40 weeks gestation of pregnancy 10/10/2023 by Vitor Pringle MD No    Priority:  Medium      Overview Addendum 10/29/2023  6:01 AM by Vitor Pringle MD     Patient on ASA prophylaxis  Elevated 1 hour glucose, normal 3-hour OGTT  Last ultrasound on : 31.5 weeks, AGA at 71st percentile  GBS negative 10/10         First pregnancy, third trimester 2023 by Michel Moseley RN No    Priority:  Medium              Subjective   Comfortable with epidural. Has some feeling of pressure, nausea and fatigue.     Objective   Last Vitals:  Temp Pulse Resp BP MAP Pulse Ox   36.4 °C (97.5 °F) 103 18 117/57   100 %     Vitals Min/Max Last 24 Hours:  Temp  Min: 36.3 °C (97.3 °F)  Max: 37 °C (98.6 °F)  Pulse  Min: 66  Max: 116  Resp  Min: 16  Max: 22  BP  Min: 103/66  Max: 143/84    Intake/Output:    Intake/Output Summary (Last 24 hours) at 2023  Last data filed at 2023 1618  Gross per 24 hour   Intake 90 ml   Output 825 ml   Net -735 ml       Physical Examination:  Well developed female in no distress.  , minimal to moderate variability. No current accelerations or decelerations  TOCO with contractions every 4-7 minutes  SVE 5/80/-3  posterior, vertex. Difficult to assess position of baby given high station.

## 2023-11-10 NOTE — PROGRESS NOTES
Social Work Brief Note     Patient: Lakeisha Edgar  [place] Name: Waylon  [place] : 23      Reason for Visit: Support      met with parents bedside to introduce self and explain role. Ms. Edgar said she is feeling well following the birth of her daughter by  on . She said she has all baby supplies she needs at home for baby. Reviewed PPD and Safe Sleep. Information was provided. Ms. Edgar will be scheduling a follow up pediatrician appt and declines needing WIC. SW offered Help Me Grow program but parents decline. They both state that they have supportive families. Parents had no additional questions or concerns at this time. SW to remain available for the remainder of the admission if needed.     Signature: KIARRA Coley

## 2023-11-10 NOTE — PROGRESS NOTES
Postpartum Progress Note    Assessment/Plan   Lakeisha Edgar is a 28 y.o., , who delivered at 40w5d gestation and is now postpartum day 1.    Doing well.  Routine postoperative care.  Scheduled motrin/tylenol when toradol course finished.  Encouraged ambulation.    Active Problems:    First pregnancy, third trimester    40 weeks gestation of pregnancy    Fetal intolerance to labor, delivered, current hospitalization    Encounter for induction of labor    Pregnancy Problems (from 23 to present)       Problem Noted Resolved    Encounter for induction of labor 2023 by Jessie Jung MD No    Priority:  Medium      40 weeks gestation of pregnancy 10/10/2023 by Vitor Pringle MD No    Priority:  Medium      Overview Addendum 10/29/2023  6:01 AM by Vitor Pringle MD     Patient on ASA prophylaxis  Elevated 1 hour glucose, normal 3-hour OGTT  Last ultrasound on : 31.5 weeks, AGA at 71st percentile  GBS negative 10/10         First pregnancy, third trimester 2023 by Michel Moseley RN No    Priority:  Medium                Subjective   Patient is doing well postpartum.  Pain is well controlled.  She is ambulating without difficulty.  She has not voided yet, beauchamp removed this am.  VB is wnl.  +Flatus.  Bottle feeding.         Objective   Allergies:   Patient has no known allergies.         Last Vitals:  Temp Pulse Resp BP MAP Pulse Ox   36.5 °C (97.7 °F) 74 16 117/81   95 %     Vitals Min/Max Last 24 Hours:  Temp  Min: 36.3 °C (97.3 °F)  Max: 37.7 °C (99.9 °F)  Pulse  Min: 67  Max: 144  Resp  Min: 16  Max: 22  BP  Min: 103/66  Max: 143/84    Intake/Output:     Intake/Output Summary (Last 24 hours) at 11/10/2023 0933  Last data filed at 11/10/2023 0500  Gross per 24 hour   Intake 1090 ml   Output 3260 ml   Net -2170 ml       Physical Exam:  Gen:  Alert, well-nourished, NAD  ABD:  Fundus firm, below umbilicus, appropriately tender.  Dressing c/d/I,  EXT:  Trace edema, no calf  tenderness

## 2023-11-10 NOTE — CARE PLAN
The patient's goals for the shift include start induction process    The clinical goals for the shift include Safe delivery/pain management    Over the shift, the patient did not make progress toward the following goals. Barriers to progression include none. Recommendations to address these barriers include none.

## 2023-11-11 PROCEDURE — 2500000001 HC RX 250 WO HCPCS SELF ADMINISTERED DRUGS (ALT 637 FOR MEDICARE OP): Performed by: OBSTETRICS & GYNECOLOGY

## 2023-11-11 PROCEDURE — 99233 SBSQ HOSP IP/OBS HIGH 50: CPT | Performed by: OBSTETRICS & GYNECOLOGY

## 2023-11-11 PROCEDURE — 96372 THER/PROPH/DIAG INJ SC/IM: CPT | Performed by: OBSTETRICS & GYNECOLOGY

## 2023-11-11 PROCEDURE — 94760 N-INVAS EAR/PLS OXIMETRY 1: CPT

## 2023-11-11 PROCEDURE — 2500000004 HC RX 250 GENERAL PHARMACY W/ HCPCS (ALT 636 FOR OP/ED): Performed by: OBSTETRICS & GYNECOLOGY

## 2023-11-11 PROCEDURE — 1100000001 HC PRIVATE ROOM DAILY

## 2023-11-11 RX ADMIN — IBUPROFEN 600 MG: 600 TABLET ORAL at 04:57

## 2023-11-11 RX ADMIN — IBUPROFEN 600 MG: 600 TABLET ORAL at 16:52

## 2023-11-11 RX ADMIN — IBUPROFEN 600 MG: 600 TABLET ORAL at 23:26

## 2023-11-11 RX ADMIN — ACETAMINOPHEN 975 MG: 325 TABLET ORAL at 23:26

## 2023-11-11 RX ADMIN — ACETAMINOPHEN 975 MG: 325 TABLET ORAL at 10:49

## 2023-11-11 RX ADMIN — ACETAMINOPHEN 975 MG: 325 TABLET ORAL at 04:57

## 2023-11-11 RX ADMIN — ENOXAPARIN SODIUM 40 MG: 100 INJECTION SUBCUTANEOUS at 10:49

## 2023-11-11 RX ADMIN — IBUPROFEN 600 MG: 600 TABLET ORAL at 10:48

## 2023-11-11 RX ADMIN — ACETAMINOPHEN 975 MG: 325 TABLET ORAL at 16:52

## 2023-11-11 ASSESSMENT — PAIN SCALES - GENERAL
PAINLEVEL_OUTOF10: 5 - MODERATE PAIN
PAINLEVEL_OUTOF10: 4
PAINLEVEL_OUTOF10: 2
PAINLEVEL_OUTOF10: 5 - MODERATE PAIN
PAINLEVEL_OUTOF10: 5 - MODERATE PAIN

## 2023-11-11 NOTE — PROGRESS NOTES
Postpartum Progress Note    Assessment/Plan   Lakeisha Edgar is a 28 y.o., , who delivered at 40w5d gestation and is now postpartum day 2.    Doing well.  Routine postoperative care.  Scheduled motrin/tylenol when toradol course finished.  Encouraged ambulation.  Extensive discussion with patient as to options for contraception.  Patient will consider her options.  Patient is noted to have hypertension during pregnancy with some elevated results.  Was treated with aspirin 81 mg twice daily as a precaution.  Current blood pressures appear to be normal range but will follow  Active Problems:    Term birth of female     Single liveborn, born in hospital, delivered by  section    Pregnancy Problems (from 23 to present)       Problem Noted Resolved    Encounter for induction of labor 2023 by Jessie Jung MD No    Priority:  Medium      40 weeks gestation of pregnancy 10/10/2023 by Vitor Pringle MD No    Priority:  Medium      Overview Addendum 10/29/2023  6:01 AM by Vitor Pringle MD     Patient on ASA prophylaxis  Elevated 1 hour glucose, normal 3-hour OGTT  Last ultrasound on : 31.5 weeks, AGA at 71st percentile  GBS negative 10/10         First pregnancy, third trimester 2023 by Michel Moseley RN No    Priority:  Medium                Subjective   Patient is doing well postpartum.  Pain is well controlled.  She is ambulating without difficulty.  She has not voided yet, beauchamp removed this am.  VB is wnl.  +Flatus.  Bottle feeding.Discussed contraception with patient.  At present she is currently uncertain.  Had long discussion with patient as to options for birth control risks and benefitsWe will revisit on postpartum visit         Objective   Allergies:   Patient has no known allergies.         Last Vitals:  Temp Pulse Resp BP MAP Pulse Ox   37 °C (98.6 °F) 75 16 126/75   98 %     Vitals Min/Max Last 24 Hours:  Temp  Min: 36.5 °C (97.7 °F)  Max: 37 °C  (98.6 °F)  Pulse  Min: 74  Max: 94  Resp  Min: 16  Max: 18  BP  Min: 117/81  Max: 134/84    Intake/Output:     Intake/Output Summary (Last 24 hours) at 11/11/2023 0745  Last data filed at 11/10/2023 1230  Gross per 24 hour   Intake --   Output 700 ml   Net -700 ml       Physical Exam:  Gen:  Alert, well-nourished, NAD  Cardiac exam shows normal sinus rhythm  Lungs are clear to auscultation and percussion  ABD:  Fundus firm, below umbilicus, appropriately tender.  Dressing c/d/I,  EXT:  Trace edema, no calf tenderness

## 2023-11-12 VITALS
RESPIRATION RATE: 16 BRPM | TEMPERATURE: 97.9 F | OXYGEN SATURATION: 97 % | HEART RATE: 87 BPM | SYSTOLIC BLOOD PRESSURE: 124 MMHG | DIASTOLIC BLOOD PRESSURE: 81 MMHG

## 2023-11-12 PROCEDURE — 93010 ELECTROCARDIOGRAM REPORT: CPT | Performed by: INTERNAL MEDICINE

## 2023-11-12 PROCEDURE — 2500000001 HC RX 250 WO HCPCS SELF ADMINISTERED DRUGS (ALT 637 FOR MEDICARE OP): Performed by: OBSTETRICS & GYNECOLOGY

## 2023-11-12 PROCEDURE — 2500000004 HC RX 250 GENERAL PHARMACY W/ HCPCS (ALT 636 FOR OP/ED): Performed by: OBSTETRICS & GYNECOLOGY

## 2023-11-12 PROCEDURE — 96372 THER/PROPH/DIAG INJ SC/IM: CPT | Performed by: OBSTETRICS & GYNECOLOGY

## 2023-11-12 RX ORDER — ACETAMINOPHEN 500 MG
1000 TABLET ORAL EVERY 6 HOURS PRN
Qty: 60 TABLET | Refills: 0 | Status: SHIPPED | OUTPATIENT
Start: 2023-11-12

## 2023-11-12 RX ORDER — IBUPROFEN 600 MG/1
600 TABLET ORAL EVERY 6 HOURS PRN
Qty: 30 TABLET | Refills: 0 | Status: SHIPPED | OUTPATIENT
Start: 2023-11-12

## 2023-11-12 RX ADMIN — ACETAMINOPHEN 975 MG: 325 TABLET ORAL at 04:36

## 2023-11-12 RX ADMIN — IBUPROFEN 600 MG: 600 TABLET ORAL at 04:36

## 2023-11-12 RX ADMIN — ENOXAPARIN SODIUM 40 MG: 100 INJECTION SUBCUTANEOUS at 11:19

## 2023-11-12 RX ADMIN — SIMETHICONE 80 MG: 80 TABLET, CHEWABLE ORAL at 09:22

## 2023-11-12 RX ADMIN — IBUPROFEN 600 MG: 600 TABLET ORAL at 11:19

## 2023-11-12 RX ADMIN — ACETAMINOPHEN 975 MG: 325 TABLET ORAL at 11:19

## 2023-11-12 ASSESSMENT — PAIN SCALES - GENERAL
PAINLEVEL_OUTOF10: 4

## 2023-11-12 NOTE — SIGNIFICANT EVENT
Pt c/o chest pain. Pt states pain is in back and is radiating to her chest. GIANNA Fuller CNM notified. ECG performed. Results reviewed with GIANNA Fuller CNM. Pt given simethicone. Will continue to monitor.

## 2023-11-12 NOTE — DISCHARGE SUMMARY
Discharge Summary    Admission Date: 2023  Discharge Date: 23    Discharge Diagnosis   section     Hospital Course  Delivery Date: 2023  9:30 PM   Delivery type: , Low Transverse    GA at delivery: 40w5d  Outcome: Living   Anesthesia during delivery: Epidural   Intrapartum complications: Fetal Intolerance;Failure to Progress in First Stage   Feeding method: Breastfeeding Status: No     Procedures:  section  Contraception at discharge: will discuss with ambulatory provider    Subjective:  Tired but happy. Formula feeding. Had c/o chest pain but points to epigastric area. Normal EKG. Reports she's beginning to feel better and feels gas moving. Is walking around. Void & bm.     Pertinent Physical Exam At Time of Discharge  breasts soft, nontender  abdomen non-distended  fundus firm, u/1  Incision dressing has area of old blood. Nothing fresh. Is beginning to fray at the lower edge but not yet coming off  Lochia scant    A/P:   on post-op day 3   Appropriate recovery and involution to date  Gas pain, apparently resolving  Formula feeding    Ok discharge today   Postpartum precautions discussed with attention to postpartum depression, postpartum hemorrhage, infection and signs/symptoms of preeclampsia.  Pt to F/U in the office in 1-2 weeks for incision check. Advised pt when/how to remove dressing  Discuss contraception with ambulatory provider    Discharge Meds     Your medication list        START taking these medications        Instructions Last Dose Given Next Dose Due   acetaminophen 500 mg tablet  Commonly known as: Tylenol  Replaces: acetaminophen 325 mg suppository      Take 2 tablets (1,000 mg) by mouth every 6 hours if needed for mild pain (1 - 3).       ibuprofen 600 mg tablet      Take 1 tablet (600 mg) by mouth every 6 hours if needed for mild pain (1 - 3).              CONTINUE taking these medications        Instructions Last Dose Given Next Dose Due   docusate  sodium 100 mg capsule  Commonly known as: Colace           PRENATAL ORAL           pyridoxine 100 mg tablet  Commonly known as: Vitamin B-6                  STOP taking these medications      acetaminophen 325 mg suppository  Commonly known as: Tylenol  Replaced by: acetaminophen 500 mg tablet        aspirin 81 mg EC tablet        ferrous sulfate (325 mg ferrous sulfate) tablet        magnesium (amino acid chelate) 133 mg tablet        polyethylene glycol 17 gram/dose powder  Commonly known as: Glycolax, Miralax        Sleep Aid (doxylamine) 25 mg tablet  Generic drug: doxylamine                  Where to Get Your Medications        These medications were sent to [id number] - [place], [place] - [address]  [address], [place] [address]      Phone: [phone]   acetaminophen 500 mg tablet  ibuprofen 600 mg tablet          Test Results Pending At Discharge  Pending Labs       Order Current Status    Surgical Pathology Exam - [facility] In process            Outpatient Follow-Up  Future Appointments   Date Time Provider Department Center   11/20/2023 11:20 AM [id number] spent 20 minutes in the professional and overall care of this patient.      ÓSCAR Singer-THU

## 2023-11-12 NOTE — CARE PLAN
The patient's goals for the shift include discharge    The clinical goals for the shift include discharge    Over the shift, the patient met all goals. Barriers to progression include n/a. Recommendations to address these barriers include n/a.

## 2023-11-14 LAB
LABORATORY COMMENT REPORT: NORMAL
PATH REPORT.FINAL DX SPEC: NORMAL
PATH REPORT.GROSS SPEC: NORMAL
PATH REPORT.RELEVANT HX SPEC: NORMAL
PATH REPORT.TOTAL CANCER: NORMAL

## 2023-11-15 ENCOUNTER — HOSPITAL ENCOUNTER (OUTPATIENT)
Dept: CARDIOLOGY | Facility: HOSPITAL | Age: 28
Discharge: HOME | End: 2023-11-15
Payer: COMMERCIAL

## 2023-11-15 LAB
ATRIAL RATE: 71 BPM
P AXIS: 60 DEGREES
P OFFSET: 196 MS
P ONSET: 151 MS
PR INTERVAL: 146 MS
Q ONSET: 224 MS
QRS COUNT: 12 BEATS
QRS DURATION: 72 MS
QT INTERVAL: 380 MS
QTC CALCULATION(BAZETT): 412 MS
QTC FREDERICIA: 402 MS
R AXIS: 42 DEGREES
T AXIS: 20 DEGREES
T OFFSET: 414 MS
VENTRICULAR RATE: 71 BPM

## 2023-11-15 PROCEDURE — 93005 ELECTROCARDIOGRAM TRACING: CPT

## 2023-11-20 ENCOUNTER — POSTPARTUM VISIT (OUTPATIENT)
Dept: OBSTETRICS AND GYNECOLOGY | Facility: CLINIC | Age: 28
End: 2023-11-20
Payer: COMMERCIAL

## 2023-11-20 VITALS — SYSTOLIC BLOOD PRESSURE: 116 MMHG | DIASTOLIC BLOOD PRESSURE: 82 MMHG | WEIGHT: 185 LBS | BODY MASS INDEX: 31.02 KG/M2

## 2023-11-20 PROBLEM — O99.019 ANEMIA OF PREGNANCY (HHS-HCC): Status: RESOLVED | Noted: 2023-09-29 | Resolved: 2023-11-20

## 2023-11-20 PROCEDURE — 0503F POSTPARTUM CARE VISIT: CPT | Performed by: OBSTETRICS & GYNECOLOGY

## 2023-11-20 NOTE — PROGRESS NOTES
Subjective   Patient ID: Lakeisha Edgar is a 28 y.o. female who presents for Postpartum Care (Here for 2 week post partum-having cramping and swelling in feet wanted to discuss bleeding ).  HPI  28-year-old here for her postpartum and postop check.  Patient with a primary  section on .  Patient states she is doing well, pain well controlled with ibuprofen and Tylenol.  Bleeding has slowed down.  Patient is bottlefeeding.  Denies any depression.        Objective   Physical Exam  Constitutional:       Appearance: Normal appearance.   Abdominal:      Palpations: Abdomen is soft. There is no mass.      Tenderness: There is no abdominal tenderness.      Comments: Incision dry and healing well   Neurological:      Mental Status: She is alert.   Psychiatric:         Mood and Affect: Mood normal.         Behavior: Behavior normal.         Assessment/Plan   Problem List Items Addressed This Visit             ICD-10-CM    Encounter for postpartum visit - Primary Z39.2     -- POST PARTUM: Patient status post primary  due to CPD and fetal intolerance on .  Patient was a good postpartum course.  Incision healing well.  --CONTRACEPTION: Patient is considering either the Nexplanon or the Mirena.  We will consider insertion at her 6-week visit.  --ASCUS with negative HPV 2023  --Patient is P1   PLAN:  Information on the Nexplanon and Mirena  Follow-up in 1 month for 6-week postpartum check

## 2023-11-20 NOTE — ASSESSMENT & PLAN NOTE
-- POST PARTUM: Patient status post primary  due to CPD and fetal intolerance on .  Patient was a good postpartum course.  Incision healing well.  --CONTRACEPTION: Patient is considering either the Nexplanon or the Mirena.  We will consider insertion at her 6-week visit.  --ASCUS with negative HPV 2023  --Patient is P1     PLAN:  Information on the Nexplanon and Mirena  Follow-up in 1 month for 6-week postpartum check

## 2023-12-19 ENCOUNTER — APPOINTMENT (OUTPATIENT)
Dept: OBSTETRICS AND GYNECOLOGY | Facility: CLINIC | Age: 28
End: 2023-12-19
Payer: COMMERCIAL

## 2025-07-08 ENCOUNTER — APPOINTMENT (OUTPATIENT)
Dept: PRIMARY CARE | Facility: CLINIC | Age: 30
End: 2025-07-08
Payer: COMMERCIAL

## (undated) DEVICE — CATHETER TRAY, SURESTEP, 16FR, URINE METER W/STATLOCK